# Patient Record
Sex: FEMALE | Race: BLACK OR AFRICAN AMERICAN | NOT HISPANIC OR LATINO | Employment: OTHER | URBAN - METROPOLITAN AREA
[De-identification: names, ages, dates, MRNs, and addresses within clinical notes are randomized per-mention and may not be internally consistent; named-entity substitution may affect disease eponyms.]

---

## 2022-12-18 ENCOUNTER — APPOINTMENT (OUTPATIENT)
Dept: GENERAL RADIOLOGY | Facility: HOSPITAL | Age: 72
End: 2022-12-18

## 2022-12-18 ENCOUNTER — HOSPITAL ENCOUNTER (OUTPATIENT)
Facility: HOSPITAL | Age: 72
Setting detail: OBSERVATION
Discharge: HOME | End: 2022-12-21
Attending: EMERGENCY MEDICINE | Admitting: EMERGENCY MEDICINE

## 2022-12-18 ENCOUNTER — HOSPITAL ENCOUNTER (EMERGENCY)
Facility: HOSPITAL | Age: 72
Discharge: HOME | End: 2022-12-18
Attending: EMERGENCY MEDICINE | Admitting: EMERGENCY MEDICINE

## 2022-12-18 VITALS
WEIGHT: 185 LBS | TEMPERATURE: 97.9 F | SYSTOLIC BLOOD PRESSURE: 114 MMHG | HEIGHT: 69 IN | BODY MASS INDEX: 27.4 KG/M2 | DIASTOLIC BLOOD PRESSURE: 62 MMHG | OXYGEN SATURATION: 100 % | HEART RATE: 62 BPM | RESPIRATION RATE: 18 BRPM

## 2022-12-18 DIAGNOSIS — Z87.448 HISTORY OF RENAL INSUFFICIENCY: ICD-10-CM

## 2022-12-18 DIAGNOSIS — M54.42 ACUTE LEFT-SIDED BACK PAIN WITH SCIATICA: Primary | ICD-10-CM

## 2022-12-18 DIAGNOSIS — M51.36 DEGENERATIVE DISC DISEASE, LUMBAR: ICD-10-CM

## 2022-12-18 DIAGNOSIS — Z86.39 HISTORY OF TYPE 2 DIABETES MELLITUS: ICD-10-CM

## 2022-12-18 DIAGNOSIS — M54.59 INTRACTABLE LOW BACK PAIN: ICD-10-CM

## 2022-12-18 DIAGNOSIS — N39.0 ACUTE URINARY TRACT INFECTION: Primary | ICD-10-CM

## 2022-12-18 DIAGNOSIS — M54.42 ACUTE BILATERAL LOW BACK PAIN WITH LEFT-SIDED SCIATICA: ICD-10-CM

## 2022-12-18 PROCEDURE — 73502 X-RAY EXAM HIP UNI 2-3 VIEWS: CPT

## 2022-12-18 PROCEDURE — 99284 EMERGENCY DEPT VISIT MOD MDM: CPT

## 2022-12-18 PROCEDURE — 72110 X-RAY EXAM L-2 SPINE 4/>VWS: CPT

## 2022-12-18 PROCEDURE — 99283 EMERGENCY DEPT VISIT LOW MDM: CPT

## 2022-12-18 RX ORDER — TRAMADOL HYDROCHLORIDE 50 MG/1
50 TABLET ORAL ONCE
Status: COMPLETED | OUTPATIENT
Start: 2022-12-18 | End: 2022-12-18

## 2022-12-18 RX ORDER — TIZANIDINE 4 MG/1
2 TABLET ORAL ONCE
Status: COMPLETED | OUTPATIENT
Start: 2022-12-18 | End: 2022-12-18

## 2022-12-18 RX ORDER — LIDOCAINE 50 MG/G
1 PATCH TOPICAL
Status: DISCONTINUED | OUTPATIENT
Start: 2022-12-18 | End: 2022-12-18 | Stop reason: HOSPADM

## 2022-12-18 RX ORDER — ACETAMINOPHEN 500 MG
1000 TABLET ORAL ONCE
Status: COMPLETED | OUTPATIENT
Start: 2022-12-18 | End: 2022-12-18

## 2022-12-18 RX ORDER — LIDOCAINE 50 MG/G
1 PATCH TOPICAL EVERY 24 HOURS
Qty: 12 PATCH | Refills: 0 | Status: SHIPPED | OUTPATIENT
Start: 2022-12-18

## 2022-12-18 RX ORDER — TIZANIDINE HYDROCHLORIDE 2 MG/1
2 CAPSULE, GELATIN COATED ORAL 3 TIMES DAILY
Qty: 10 CAPSULE | Refills: 0 | Status: SHIPPED | OUTPATIENT
Start: 2022-12-18

## 2022-12-18 RX ADMIN — TIZANIDINE 2 MG: 4 TABLET ORAL at 03:06

## 2022-12-18 RX ADMIN — LIDOCAINE 1 PATCH: 700 PATCH TOPICAL at 03:06

## 2022-12-18 RX ADMIN — ACETAMINOPHEN 1000 MG: 500 TABLET ORAL at 03:06

## 2022-12-18 RX ADMIN — TRAMADOL HYDROCHLORIDE 50 MG: 50 TABLET, COATED ORAL at 04:10

## 2022-12-18 NOTE — DISCHARGE INSTRUCTIONS
Please follow-up with your primary care provider, if you not have a primary care provider please utilize patient connection above to establish care  Return to the ED for new or worsening symptoms  Follow up with Specialist if indicated above-call for an appointment  May continue your Tylenol at home

## 2022-12-18 NOTE — ED PROVIDER NOTES
Subjective   History of Present Illness  Patient presents with:  Hip Pain    No primary care provider on file.   No LMP recorded. Patient has had a hysterectomy.  Patient is a 72-year-old female presents the ED with complaint of left hip pain, low back pain.  Patient reports this began on Friday, she denies any new direct trauma injury or fall.  She reports that the pain is in the left lower back down the left hip and down the back of the left leg.  Denies any weakness, no urinary retention, bowel or bladder incontinence, no IVDA.  She reports that she feels that she intermittently has numbness and tingling down the left leg.  Normally pain or swelling.  No fever chills.  No urinary retention, constipation, bowel or bladder incontinence.            Review of Systems   Constitutional: Negative for chills and fever.   Cardiovascular: Negative for chest pain and leg swelling.   Gastrointestinal: Negative for abdominal pain.   Musculoskeletal: Positive for back pain.   Skin: Negative for color change, rash and wound.   Neurological: Positive for numbness. Negative for weakness.         History reviewed. No pertinent past medical history.  Current Medications:   Outpatient Medications as of 10/18/2022   Medication Sig Dispense Refill   • Blood Glucose Monitoring Suppl (ONE TOUCH ULTRA 2) W/DEVICE KIT Inject 1 each into the skin daily 1 each 0   • carvedilol (COREG) 3.125 MG tablet Take 1 tablet by mouth 2 (two) times daily with meals. 180 tablet 3   • Cholecalciferol (VITAMIN D) 2000 units tablet Take 2 tablets by mouth daily. 120 tablet 3   • ergocalciferol (ERGOCALCIFEROL) 1.25 MG (96158 UT) capsule Take 50,000 Units by mouth.   • glucose blood (ONE TOUCH ULTRA TEST) test strip Test blood sugar TID or as directed by physician. 300 strip 11   • hydrALAZINE (APRESOLINE) 10 MG tablet Take 10 mg by mouth 3 (three) times daily .   • insulin detemir (LEVEMIR FLEXTOUCH) 100 UNIT/ML pen Inject 100 Units into the skin nightly.  30 pen 3   • insulin lispro (HUMALOG KWIKPEN) 200 UNIT/ML SOL PEN inj pen Inject 40 Units into the skin 3 (three) times daily before meals. 18 pen 3   • Insulin Pen Needle 31G X 6 MM MISC Inject 1 each into the skin daily 3-4 times daily. 400 each 11   • Lancets (ONETOUCH ULTRASOFT) lancets Test blood sugar TID or as directed by physician. 300 each 3   • levothyroxine (SYNTHROID) 100 MCG tablet Take 1 tablet by mouth daily. 90 tablet 3     Problem List:  Patient Active Problem List   Diagnosis Date Noted   • Type 2 diabetes mellitus with stage 3 chronic kidney disease (CMS/[facility]) 12/08/2022   • Long-term insulin use (CMS/HCC) 12/08/2022   • Family history of breast cancer in sister 07/12/2019   • Chronic kidney disease (CKD) stage G3a/A2, moderately decreased glomerular filtration rate (GFR) between 45-59 mL/min/1.73 square meter and albuminuria creatinine ratio between  mg/g (CMS/[facility]) 10/11/2017   • Microalbuminuria due to type 2 diabetes mellitus (CMS/[facility]) 02/19/2017   • Tubular adenoma 11/27/2016   • Diverticulosis of colon 11/21/2016   • Internal hemorrhoid 11/21/2016   • Vitamin D deficiency 11/17/2016   • Hypothyroidism 09/28/2016   • Multinodular goiter 04/01/2016   • Hyperlipidemia LDL goal <70 03/14/2016   • Type 2 diabetes mellitus with hemoglobin A1c goal of less than 7.5% (CMS/[facility]) 03/14/2016   • HTN, goal below 130/80      No Known Allergies    History reviewed. No pertinent surgical history.    History reviewed. No pertinent family history.    Social History     Socioeconomic History   • Marital status:            Objective   Physical Exam  Vitals and nursing note reviewed.   Constitutional:       Appearance: Normal appearance.   HENT:      Head: Normocephalic and atraumatic.   Cardiovascular:      Rate and Rhythm: Normal rate and regular rhythm.      Heart sounds: Normal heart sounds. No murmur heard.    No friction rub. No gallop.   Pulmonary:      Effort: Pulmonary effort is normal.       "Breath sounds: Normal breath sounds.   Musculoskeletal:      Lumbar back: Tenderness present. No bony tenderness.        Back:       Comments: No vertebral point tenderness noted to the C-spine T-spine or L-spine.  No palpable step-offs.  No skin abnormalities are appreciated.  No saddle anesthesia.  Soft tissue tenderness noted over the SI joint. No yahir tenderness to the hip or extremities.    Skin:     General: Skin is warm and dry.   Neurological:      Mental Status: She is alert and oriented to person, place, and time.      GCS: GCS eye subscore is 4. GCS verbal subscore is 5. GCS motor subscore is 6.      Motor: Motor function is intact. No weakness.      Deep Tendon Reflexes:      Reflex Scores:       Patellar reflexes are 2+ on the right side and 2+ on the left side.     Comments: Sensation intact to light touch.  No weakness appreciated to bilateral lower extremities         Procedures           ED Course  ED Course as of 12/18/22 0434   Sun Dec 18, 2022   0336 Patient reports pain is improving, but is worse when she gets up and moves.  Reports the patient has a history of kidney disease she cannot take NSAIDs.  She also has a history of diabetes, no refill steroids would increase her blood sugar.  We will give her a dose of tramadol here she is going to continue to take Tylenol at home, will prescribe her Lidoderm patches as well as Zanaflex.  Patient agrees with this treatment plan [LB]   0418 Pt's , son and daughter at bedside. Agree with current tx plan of care, updated on results.  [LB]   0433 Ambulating out of ED at d/c. [LB]      ED Course User Index  [LB] Shirley Graham, SPENCER      /62   Pulse 62   Temp 97.9 °F (36.6 °C) (Oral)   Resp 18   Ht 175.3 cm (69\")   Wt 83.9 kg (185 lb)   SpO2 100%   BMI 27.32 kg/m²   Labs Reviewed - No data to display  Medications   lidocaine (LIDODERM) 5 % 1 patch (1 patch Transdermal Medication Applied 12/18/22 0306)   tiZANidine (ZANAFLEX) " tablet 2 mg (2 mg Oral Given 12/18/22 0306)   acetaminophen (TYLENOL) tablet 1,000 mg (1,000 mg Oral Given 12/18/22 0306)   traMADol (ULTRAM) tablet 50 mg (50 mg Oral Given 12/18/22 0410)     XR Spine Lumbar Complete 4+VW    Result Date: 12/18/2022  1. No acute osseous abnormality. 2. Degenerative grade 1 anterolisthesis of L4 on L5 with mild degenerative changes at this level. Slot 63 Electronically signed by:  Mark Smith M.D.  12/18/2022 1:31 AM Mountain Time    XR Hip With or Without Pelvis 2 - 3 View Left    Result Date: 12/18/2022  Normal left hip. Electronically signed by:  Josué Simpson M.D.  12/18/2022 1:31 AM Mountain Time                                         MDM  Number of Diagnoses or Management Options     Amount and/or Complexity of Data Reviewed  Tests in the radiology section of CPT®: reviewed      Chart review: Reviewed patient's office visit on October 18 with elicia Coronel everywhere. Patient does have a history of chronic kidney disease, diabetes, hyperlipidemia.  Appropriate PPE worn during patient interactions.   Differentials: Sciatica, cauda equina, strain  This is not an all inclusive list of diagnosis considered.   Patient was brought back to the emergency department room for evaluation and placed on appropriate monitoring.  She presented with left lower back pain, radiates to left leg, signs and symptoms to be consistent with sciatica.  She did have x-ray imaging which reveals no acute findings.  There is no evidence for cauda equina.  She reports her pain is improved here.  She reports at home she was unable to move, she is able to move while here, she is able to ambulate. Given patient's chronic kidney disease and diabetes she will continue Tylenol, she will be given a short course of tizanidine, as well as Lidoderm patches.  Advise follow-up with PCP for continued reevaluation and further management of her back pain.  Disposition: I spoke with the patient at the bedside  regarding their plan of care, discharge instruction,  prescriptions, as well as reasons to return to the emergency department.  We discussed test results at the bedside, including incidental abnormal labs, radiological findings, understands need and importance  for follow-up with primary care or specialist if indicated.  Patient verbalizes understanding and agrees to the treatment plan at this time.    Note Disclaimer: At [facility], we believe that sharing information builds trust and better relationships. You are receiving this note because you recently visited [facility]. It is possible you will see health information before a provider has talked with you about it. This kind of information can be easy to misunderstand. To help you fully understand what it means for your health, we urge you to discuss this note with your provider.  Note dictated utilizing Dragon Dictation.     Final diagnoses:   Acute left-sided back pain with sciatica       ED Disposition  ED Disposition     ED Disposition   Discharge    Condition   Stable    Comment   --             Evita Keith, APRN  [address] [place] [address] [phone]    Schedule an appointment as soon as possible for a visit       [facility] EMERGENCY DEPARTMENT  [facility] [address] [phone]    As needed, If symptoms worsen         Medication List      New Prescriptions    lidocaine 5 %  Commonly known as: LIDODERM  Place 1 patch on the skin as directed by provider Daily. Remove & Discard patch within 12 hours or as directed by MD     TiZANidine 2 MG capsule  Commonly known as: ZANAFLEX  Take 1 capsule by mouth 3 (Three) Times a Day.           Where to Get Your Medications      These medications were sent to [facility][id number] - [facility] [place] - [address] AT [place] [address] [address] [facility] [phone] [facility] [phone] FX  [address] [place] [address]     Phone: [phone]   · lidocaine 5 %  · TiZANidine 2 MG capsule          Shirley Graham, SPENCER  12/18/22 7027       Shirley Graham, SPENCER  12/18/22 5543       Shirley Graham, SPENCER  12/18/22 9421

## 2022-12-19 ENCOUNTER — APPOINTMENT (OUTPATIENT)
Dept: MRI IMAGING | Facility: HOSPITAL | Age: 72
End: 2022-12-19

## 2022-12-19 ENCOUNTER — APPOINTMENT (OUTPATIENT)
Dept: CT IMAGING | Facility: HOSPITAL | Age: 72
End: 2022-12-19

## 2022-12-19 PROBLEM — M51.26 DEGENERATION OF LUMBAR INTERVERTEBRAL DISC WITH ACUTE HERNIATION: Status: ACTIVE | Noted: 2022-12-19

## 2022-12-19 PROBLEM — M51.36 DEGENERATION OF LUMBAR INTERVERTEBRAL DISC WITH ACUTE HERNIATION: Status: ACTIVE | Noted: 2022-12-19

## 2022-12-19 PROBLEM — M51.369 DEGENERATION OF LUMBAR INTERVERTEBRAL DISC WITH ACUTE HERNIATION: Status: ACTIVE | Noted: 2022-12-19

## 2022-12-19 LAB
ALBUMIN SERPL-MCNC: 4.1 G/DL (ref 3.5–5.2)
ALBUMIN/GLOB SERPL: 1.4 G/DL
ALP SERPL-CCNC: 96 U/L (ref 39–117)
ALT SERPL W P-5'-P-CCNC: 10 U/L (ref 1–33)
ANION GAP SERPL CALCULATED.3IONS-SCNC: 11 MMOL/L (ref 5–15)
AST SERPL-CCNC: 12 U/L (ref 1–32)
BACTERIA UR QL AUTO: ABNORMAL /HPF
BASOPHILS # BLD AUTO: 0 10*3/MM3 (ref 0–0.2)
BASOPHILS NFR BLD AUTO: 0.4 % (ref 0–1.5)
BILIRUB SERPL-MCNC: 0.3 MG/DL (ref 0–1.2)
BILIRUB UR QL STRIP: NEGATIVE
BUN SERPL-MCNC: 23 MG/DL (ref 8–23)
BUN/CREAT SERPL: 19.7 (ref 7–25)
CALCIUM SPEC-SCNC: 9.2 MG/DL (ref 8.6–10.5)
CHLORIDE SERPL-SCNC: 105 MMOL/L (ref 98–107)
CK SERPL-CCNC: 230 U/L (ref 20–180)
CLARITY UR: CLEAR
CO2 SERPL-SCNC: 22 MMOL/L (ref 22–29)
COLOR UR: YELLOW
CREAT SERPL-MCNC: 1.17 MG/DL (ref 0.57–1)
DEPRECATED RDW RBC AUTO: 42.9 FL (ref 37–54)
EGFRCR SERPLBLD CKD-EPI 2021: 50 ML/MIN/1.73
EOSINOPHIL # BLD AUTO: 0 10*3/MM3 (ref 0–0.4)
EOSINOPHIL NFR BLD AUTO: 0 % (ref 0.3–6.2)
ERYTHROCYTE [DISTWIDTH] IN BLOOD BY AUTOMATED COUNT: 14.4 % (ref 12.3–15.4)
GLOBULIN UR ELPH-MCNC: 2.9 GM/DL
GLUCOSE BLDC GLUCOMTR-MCNC: 234 MG/DL (ref 70–105)
GLUCOSE BLDC GLUCOMTR-MCNC: 238 MG/DL (ref 70–105)
GLUCOSE BLDC GLUCOMTR-MCNC: 254 MG/DL (ref 70–105)
GLUCOSE BLDC GLUCOMTR-MCNC: 315 MG/DL (ref 70–105)
GLUCOSE SERPL-MCNC: 190 MG/DL (ref 65–99)
GLUCOSE UR STRIP-MCNC: NEGATIVE MG/DL
HCT VFR BLD AUTO: 34.4 % (ref 34–46.6)
HGB BLD-MCNC: 10.5 G/DL (ref 12–15.9)
HGB UR QL STRIP.AUTO: ABNORMAL
HYALINE CASTS UR QL AUTO: ABNORMAL /LPF
KETONES UR QL STRIP: ABNORMAL
LEUKOCYTE ESTERASE UR QL STRIP.AUTO: ABNORMAL
LYMPHOCYTES # BLD AUTO: 1.3 10*3/MM3 (ref 0.7–3.1)
LYMPHOCYTES NFR BLD AUTO: 14.8 % (ref 19.6–45.3)
MCH RBC QN AUTO: 25.8 PG (ref 26.6–33)
MCHC RBC AUTO-ENTMCNC: 30.7 G/DL (ref 31.5–35.7)
MCV RBC AUTO: 84.2 FL (ref 79–97)
MONOCYTES # BLD AUTO: 0.5 10*3/MM3 (ref 0.1–0.9)
MONOCYTES NFR BLD AUTO: 5.9 % (ref 5–12)
NEUTROPHILS NFR BLD AUTO: 6.8 10*3/MM3 (ref 1.7–7)
NEUTROPHILS NFR BLD AUTO: 78.9 % (ref 42.7–76)
NITRITE UR QL STRIP: POSITIVE
NRBC BLD AUTO-RTO: 0 /100 WBC (ref 0–0.2)
PH UR STRIP.AUTO: 5.5 [PH] (ref 5–8)
PLATELET # BLD AUTO: 269 10*3/MM3 (ref 140–450)
PMV BLD AUTO: 9 FL (ref 6–12)
POTASSIUM SERPL-SCNC: 3.8 MMOL/L (ref 3.5–5.2)
PROT SERPL-MCNC: 7 G/DL (ref 6–8.5)
PROT UR QL STRIP: ABNORMAL
RBC # BLD AUTO: 4.08 10*6/MM3 (ref 3.77–5.28)
RBC # UR STRIP: ABNORMAL /HPF
REF LAB TEST METHOD: ABNORMAL
SODIUM SERPL-SCNC: 138 MMOL/L (ref 136–145)
SP GR UR STRIP: 1.01 (ref 1–1.03)
SQUAMOUS #/AREA URNS HPF: ABNORMAL /HPF
UROBILINOGEN UR QL STRIP: ABNORMAL
WBC # UR STRIP: ABNORMAL /HPF
WBC NRBC COR # BLD: 8.6 10*3/MM3 (ref 3.4–10.8)

## 2022-12-19 PROCEDURE — G0378 HOSPITAL OBSERVATION PER HR: HCPCS

## 2022-12-19 PROCEDURE — 80053 COMPREHEN METABOLIC PANEL: CPT | Performed by: EMERGENCY MEDICINE

## 2022-12-19 PROCEDURE — 97162 PT EVAL MOD COMPLEX 30 MIN: CPT | Performed by: PHYSICAL THERAPIST

## 2022-12-19 PROCEDURE — 82962 GLUCOSE BLOOD TEST: CPT

## 2022-12-19 PROCEDURE — 85025 COMPLETE CBC W/AUTO DIFF WBC: CPT | Performed by: EMERGENCY MEDICINE

## 2022-12-19 PROCEDURE — 82550 ASSAY OF CK (CPK): CPT | Performed by: EMERGENCY MEDICINE

## 2022-12-19 PROCEDURE — 25010000002 CEFTRIAXONE PER 250 MG: Performed by: EMERGENCY MEDICINE

## 2022-12-19 PROCEDURE — 96375 TX/PRO/DX INJ NEW DRUG ADDON: CPT

## 2022-12-19 PROCEDURE — 96376 TX/PRO/DX INJ SAME DRUG ADON: CPT

## 2022-12-19 PROCEDURE — 87086 URINE CULTURE/COLONY COUNT: CPT | Performed by: EMERGENCY MEDICINE

## 2022-12-19 PROCEDURE — 72148 MRI LUMBAR SPINE W/O DYE: CPT

## 2022-12-19 PROCEDURE — 63710000001 INSULIN LISPRO (HUMAN) PER 5 UNITS: Performed by: NURSE PRACTITIONER

## 2022-12-19 PROCEDURE — 72131 CT LUMBAR SPINE W/O DYE: CPT

## 2022-12-19 PROCEDURE — 25010000002 METHYLPREDNISOLONE PER 40 MG: Performed by: NURSE PRACTITIONER

## 2022-12-19 PROCEDURE — 25010000002 HYDROMORPHONE 1 MG/ML SOLUTION: Performed by: NURSE PRACTITIONER

## 2022-12-19 PROCEDURE — 87077 CULTURE AEROBIC IDENTIFY: CPT | Performed by: EMERGENCY MEDICINE

## 2022-12-19 PROCEDURE — 25010000002 HYDROMORPHONE 1 MG/ML SOLUTION: Performed by: EMERGENCY MEDICINE

## 2022-12-19 PROCEDURE — 63710000001 INSULIN GLARGINE PER 5 UNITS: Performed by: NURSE PRACTITIONER

## 2022-12-19 PROCEDURE — 25010000002 ONDANSETRON PER 1 MG: Performed by: EMERGENCY MEDICINE

## 2022-12-19 PROCEDURE — 81001 URINALYSIS AUTO W/SCOPE: CPT | Performed by: EMERGENCY MEDICINE

## 2022-12-19 PROCEDURE — 96365 THER/PROPH/DIAG IV INF INIT: CPT

## 2022-12-19 PROCEDURE — 25010000002 METHYLPREDNISOLONE PER 40 MG: Performed by: EMERGENCY MEDICINE

## 2022-12-19 PROCEDURE — 87186 SC STD MICRODIL/AGAR DIL: CPT | Performed by: EMERGENCY MEDICINE

## 2022-12-19 RX ORDER — SODIUM CHLORIDE 9 MG/ML
40 INJECTION, SOLUTION INTRAVENOUS AS NEEDED
Status: DISCONTINUED | OUTPATIENT
Start: 2022-12-19 | End: 2022-12-19

## 2022-12-19 RX ORDER — ONDANSETRON 2 MG/ML
4 INJECTION INTRAMUSCULAR; INTRAVENOUS ONCE
Status: COMPLETED | OUTPATIENT
Start: 2022-12-19 | End: 2022-12-19

## 2022-12-19 RX ORDER — ATORVASTATIN CALCIUM 20 MG/1
40 TABLET, FILM COATED ORAL NIGHTLY
COMMUNITY

## 2022-12-19 RX ORDER — OLMESARTAN MEDOXOMIL 20 MG/1
40 TABLET ORAL DAILY
COMMUNITY
End: 2022-12-21 | Stop reason: HOSPADM

## 2022-12-19 RX ORDER — OLANZAPINE 10 MG/2ML
1 INJECTION, POWDER, LYOPHILIZED, FOR SOLUTION INTRAMUSCULAR
Status: DISCONTINUED | OUTPATIENT
Start: 2022-12-19 | End: 2022-12-21 | Stop reason: HOSPADM

## 2022-12-19 RX ORDER — INSULIN LISPRO 100 [IU]/ML
4-24 INJECTION, SOLUTION INTRAVENOUS; SUBCUTANEOUS
Status: DISCONTINUED | OUTPATIENT
Start: 2022-12-19 | End: 2022-12-21 | Stop reason: HOSPADM

## 2022-12-19 RX ORDER — ACETAMINOPHEN 325 MG/1
650 TABLET ORAL EVERY 4 HOURS PRN
Status: DISCONTINUED | OUTPATIENT
Start: 2022-12-19 | End: 2022-12-19 | Stop reason: SDUPTHER

## 2022-12-19 RX ORDER — ONDANSETRON 2 MG/ML
4 INJECTION INTRAMUSCULAR; INTRAVENOUS EVERY 6 HOURS PRN
Status: DISCONTINUED | OUTPATIENT
Start: 2022-12-19 | End: 2022-12-21 | Stop reason: HOSPADM

## 2022-12-19 RX ORDER — ONDANSETRON 2 MG/ML
4 INJECTION INTRAMUSCULAR; INTRAVENOUS EVERY 6 HOURS PRN
Status: DISCONTINUED | OUTPATIENT
Start: 2022-12-19 | End: 2022-12-19 | Stop reason: SDUPTHER

## 2022-12-19 RX ORDER — INSULIN LISPRO 100 [IU]/ML
3-14 INJECTION, SOLUTION INTRAVENOUS; SUBCUTANEOUS
Status: DISCONTINUED | OUTPATIENT
Start: 2022-12-19 | End: 2022-12-19 | Stop reason: ALTCHOICE

## 2022-12-19 RX ORDER — HYDRALAZINE HYDROCHLORIDE 10 MG/1
10 TABLET, FILM COATED ORAL NIGHTLY
Status: DISCONTINUED | OUTPATIENT
Start: 2022-12-19 | End: 2022-12-21 | Stop reason: HOSPADM

## 2022-12-19 RX ORDER — METHYLPREDNISOLONE SODIUM SUCCINATE 40 MG/ML
40 INJECTION, POWDER, LYOPHILIZED, FOR SOLUTION INTRAMUSCULAR; INTRAVENOUS EVERY 8 HOURS
Status: DISCONTINUED | OUTPATIENT
Start: 2022-12-19 | End: 2022-12-20

## 2022-12-19 RX ORDER — SODIUM CHLORIDE 0.9 % (FLUSH) 0.9 %
10 SYRINGE (ML) INJECTION AS NEEDED
Status: DISCONTINUED | OUTPATIENT
Start: 2022-12-19 | End: 2022-12-21 | Stop reason: HOSPADM

## 2022-12-19 RX ORDER — LEVOTHYROXINE SODIUM 0.1 MG/1
100 TABLET ORAL
Status: DISCONTINUED | OUTPATIENT
Start: 2022-12-19 | End: 2022-12-21 | Stop reason: HOSPADM

## 2022-12-19 RX ORDER — LEVOTHYROXINE SODIUM 0.1 MG/1
100 TABLET ORAL DAILY
COMMUNITY

## 2022-12-19 RX ORDER — SODIUM CHLORIDE 0.9 % (FLUSH) 0.9 %
10 SYRINGE (ML) INJECTION EVERY 12 HOURS SCHEDULED
Status: DISCONTINUED | OUTPATIENT
Start: 2022-12-19 | End: 2022-12-21 | Stop reason: HOSPADM

## 2022-12-19 RX ORDER — SODIUM CHLORIDE 9 MG/ML
100 INJECTION, SOLUTION INTRAVENOUS CONTINUOUS
Status: DISCONTINUED | OUTPATIENT
Start: 2022-12-19 | End: 2022-12-21

## 2022-12-19 RX ORDER — ACETAMINOPHEN 325 MG/1
650 TABLET ORAL EVERY 6 HOURS PRN
COMMUNITY

## 2022-12-19 RX ORDER — HYDRALAZINE HYDROCHLORIDE 10 MG/1
10 TABLET, FILM COATED ORAL NIGHTLY
COMMUNITY

## 2022-12-19 RX ORDER — NICOTINE POLACRILEX 4 MG
15 LOZENGE BUCCAL
Status: DISCONTINUED | OUTPATIENT
Start: 2022-12-19 | End: 2022-12-21 | Stop reason: HOSPADM

## 2022-12-19 RX ORDER — ONDANSETRON 4 MG/1
4 TABLET, FILM COATED ORAL EVERY 6 HOURS PRN
Status: DISCONTINUED | OUTPATIENT
Start: 2022-12-19 | End: 2022-12-21 | Stop reason: HOSPADM

## 2022-12-19 RX ORDER — DEXTROSE MONOHYDRATE 25 G/50ML
25 INJECTION, SOLUTION INTRAVENOUS
Status: DISCONTINUED | OUTPATIENT
Start: 2022-12-19 | End: 2022-12-21 | Stop reason: HOSPADM

## 2022-12-19 RX ORDER — CARVEDILOL 3.12 MG/1
3.12 TABLET ORAL 2 TIMES DAILY WITH MEALS
Status: DISCONTINUED | OUTPATIENT
Start: 2022-12-19 | End: 2022-12-21 | Stop reason: HOSPADM

## 2022-12-19 RX ORDER — BISACODYL 5 MG/1
5 TABLET, DELAYED RELEASE ORAL DAILY PRN
Status: DISCONTINUED | OUTPATIENT
Start: 2022-12-19 | End: 2022-12-21 | Stop reason: HOSPADM

## 2022-12-19 RX ORDER — TIZANIDINE 4 MG/1
4 TABLET ORAL EVERY 8 HOURS SCHEDULED
Status: DISCONTINUED | OUTPATIENT
Start: 2022-12-19 | End: 2022-12-21 | Stop reason: HOSPADM

## 2022-12-19 RX ORDER — SODIUM CHLORIDE 9 MG/ML
40 INJECTION, SOLUTION INTRAVENOUS AS NEEDED
Status: DISCONTINUED | OUTPATIENT
Start: 2022-12-19 | End: 2022-12-21 | Stop reason: HOSPADM

## 2022-12-19 RX ORDER — POLYETHYLENE GLYCOL 3350 17 G/17G
17 POWDER, FOR SOLUTION ORAL DAILY PRN
Status: DISCONTINUED | OUTPATIENT
Start: 2022-12-19 | End: 2022-12-21 | Stop reason: HOSPADM

## 2022-12-19 RX ORDER — CHOLECALCIFEROL (VITAMIN D3) 125 MCG
5 CAPSULE ORAL NIGHTLY PRN
Status: DISCONTINUED | OUTPATIENT
Start: 2022-12-19 | End: 2022-12-21 | Stop reason: HOSPADM

## 2022-12-19 RX ORDER — HYDRALAZINE HYDROCHLORIDE 10 MG/1
20 TABLET, FILM COATED ORAL DAILY
Status: DISCONTINUED | OUTPATIENT
Start: 2022-12-19 | End: 2022-12-21 | Stop reason: HOSPADM

## 2022-12-19 RX ORDER — HYDRALAZINE HYDROCHLORIDE 10 MG/1
20 TABLET, FILM COATED ORAL EVERY MORNING
COMMUNITY

## 2022-12-19 RX ORDER — BISACODYL 10 MG
10 SUPPOSITORY, RECTAL RECTAL DAILY PRN
Status: DISCONTINUED | OUTPATIENT
Start: 2022-12-19 | End: 2022-12-21 | Stop reason: HOSPADM

## 2022-12-19 RX ORDER — NICOTINE POLACRILEX 4 MG
15 LOZENGE BUCCAL
Status: DISCONTINUED | OUTPATIENT
Start: 2022-12-19 | End: 2022-12-19 | Stop reason: ALTCHOICE

## 2022-12-19 RX ORDER — CARVEDILOL 3.12 MG/1
3.12 TABLET ORAL 2 TIMES DAILY WITH MEALS
COMMUNITY

## 2022-12-19 RX ORDER — AMLODIPINE BESYLATE 10 MG/1
10 TABLET ORAL NIGHTLY
COMMUNITY

## 2022-12-19 RX ORDER — TIZANIDINE 2 MG/1
2 TABLET ORAL EVERY 8 HOURS SCHEDULED
Status: DISCONTINUED | OUTPATIENT
Start: 2022-12-19 | End: 2022-12-19

## 2022-12-19 RX ORDER — LOSARTAN POTASSIUM 50 MG/1
50 TABLET ORAL
Status: DISCONTINUED | OUTPATIENT
Start: 2022-12-19 | End: 2022-12-21

## 2022-12-19 RX ORDER — TRAMADOL HYDROCHLORIDE 50 MG/1
50 TABLET ORAL EVERY 6 HOURS PRN
Status: DISCONTINUED | OUTPATIENT
Start: 2022-12-19 | End: 2022-12-21 | Stop reason: HOSPADM

## 2022-12-19 RX ORDER — LIDOCAINE 50 MG/G
1 PATCH TOPICAL EVERY 24 HOURS
Status: DISCONTINUED | OUTPATIENT
Start: 2022-12-19 | End: 2022-12-21 | Stop reason: HOSPADM

## 2022-12-19 RX ORDER — ATORVASTATIN CALCIUM 40 MG/1
40 TABLET, FILM COATED ORAL NIGHTLY
Status: DISCONTINUED | OUTPATIENT
Start: 2022-12-19 | End: 2022-12-21 | Stop reason: HOSPADM

## 2022-12-19 RX ORDER — ACETAMINOPHEN 325 MG/1
650 TABLET ORAL EVERY 4 HOURS PRN
Status: DISCONTINUED | OUTPATIENT
Start: 2022-12-19 | End: 2022-12-21 | Stop reason: HOSPADM

## 2022-12-19 RX ORDER — INSULIN LISPRO 100 [IU]/ML
30 INJECTION, SOLUTION INTRAVENOUS; SUBCUTANEOUS
COMMUNITY

## 2022-12-19 RX ORDER — OLANZAPINE 10 MG/2ML
1 INJECTION, POWDER, LYOPHILIZED, FOR SOLUTION INTRAMUSCULAR
Status: DISCONTINUED | OUTPATIENT
Start: 2022-12-19 | End: 2022-12-19 | Stop reason: ALTCHOICE

## 2022-12-19 RX ORDER — AMLODIPINE BESYLATE 5 MG/1
10 TABLET ORAL NIGHTLY
Status: DISCONTINUED | OUTPATIENT
Start: 2022-12-19 | End: 2022-12-21 | Stop reason: HOSPADM

## 2022-12-19 RX ADMIN — Medication 10 ML: at 09:47

## 2022-12-19 RX ADMIN — HYDRALAZINE HYDROCHLORIDE 10 MG: 10 TABLET, FILM COATED ORAL at 22:13

## 2022-12-19 RX ADMIN — SODIUM CHLORIDE 250 ML: 9 INJECTION, SOLUTION INTRAVENOUS at 01:00

## 2022-12-19 RX ADMIN — TRAMADOL HYDROCHLORIDE 50 MG: 50 TABLET, COATED ORAL at 22:12

## 2022-12-19 RX ADMIN — CEFTRIAXONE 1 G: 1 INJECTION, POWDER, FOR SOLUTION INTRAMUSCULAR; INTRAVENOUS at 03:14

## 2022-12-19 RX ADMIN — HYDROMORPHONE HYDROCHLORIDE 0.5 MG: 1 INJECTION, SOLUTION INTRAMUSCULAR; INTRAVENOUS; SUBCUTANEOUS at 10:18

## 2022-12-19 RX ADMIN — CARVEDILOL 3.12 MG: 3.12 TABLET, FILM COATED ORAL at 07:52

## 2022-12-19 RX ADMIN — METHYLPREDNISOLONE SODIUM SUCCINATE 40 MG: 40 INJECTION, POWDER, FOR SOLUTION INTRAMUSCULAR; INTRAVENOUS at 18:18

## 2022-12-19 RX ADMIN — CARVEDILOL 3.12 MG: 3.12 TABLET, FILM COATED ORAL at 17:37

## 2022-12-19 RX ADMIN — HYDRALAZINE HYDROCHLORIDE 20 MG: 10 TABLET, FILM COATED ORAL at 08:03

## 2022-12-19 RX ADMIN — Medication 10 ML: at 22:14

## 2022-12-19 RX ADMIN — METHYLPREDNISOLONE SODIUM SUCCINATE 40 MG: 40 INJECTION, POWDER, FOR SOLUTION INTRAMUSCULAR; INTRAVENOUS at 03:14

## 2022-12-19 RX ADMIN — INSULIN GLARGINE 100 UNITS: 100 INJECTION, SOLUTION SUBCUTANEOUS at 22:11

## 2022-12-19 RX ADMIN — ONDANSETRON 4 MG: 2 INJECTION INTRAMUSCULAR; INTRAVENOUS at 01:01

## 2022-12-19 RX ADMIN — LEVOTHYROXINE SODIUM 100 MCG: 0.1 TABLET ORAL at 07:52

## 2022-12-19 RX ADMIN — ATORVASTATIN CALCIUM 40 MG: 40 TABLET, FILM COATED ORAL at 22:13

## 2022-12-19 RX ADMIN — HYDROMORPHONE HYDROCHLORIDE 0.5 MG: 1 INJECTION, SOLUTION INTRAMUSCULAR; INTRAVENOUS; SUBCUTANEOUS at 05:19

## 2022-12-19 RX ADMIN — HYDROMORPHONE HYDROCHLORIDE 0.5 MG: 1 INJECTION, SOLUTION INTRAMUSCULAR; INTRAVENOUS; SUBCUTANEOUS at 03:14

## 2022-12-19 RX ADMIN — LIDOCAINE 1 PATCH: 50 PATCH CUTANEOUS at 09:46

## 2022-12-19 RX ADMIN — INSULIN LISPRO 5 UNITS: 100 INJECTION, SOLUTION INTRAVENOUS; SUBCUTANEOUS at 09:46

## 2022-12-19 RX ADMIN — INSULIN LISPRO 10 UNITS: 100 INJECTION, SOLUTION INTRAVENOUS; SUBCUTANEOUS at 13:19

## 2022-12-19 RX ADMIN — HYDROMORPHONE HYDROCHLORIDE 0.5 MG: 1 INJECTION, SOLUTION INTRAMUSCULAR; INTRAVENOUS; SUBCUTANEOUS at 07:57

## 2022-12-19 RX ADMIN — TRAMADOL HYDROCHLORIDE 50 MG: 50 TABLET, COATED ORAL at 12:41

## 2022-12-19 RX ADMIN — TIZANIDINE 4 MG: 4 TABLET ORAL at 22:14

## 2022-12-19 RX ADMIN — TIZANIDINE 2 MG: 2 TABLET ORAL at 13:19

## 2022-12-19 RX ADMIN — AMLODIPINE BESYLATE 10 MG: 5 TABLET ORAL at 22:14

## 2022-12-19 RX ADMIN — TIZANIDINE 2 MG: 2 TABLET ORAL at 07:52

## 2022-12-19 RX ADMIN — HYDROMORPHONE HYDROCHLORIDE 1 MG: 1 INJECTION, SOLUTION INTRAMUSCULAR; INTRAVENOUS; SUBCUTANEOUS at 17:32

## 2022-12-19 RX ADMIN — SODIUM CHLORIDE 100 ML/HR: 9 INJECTION, SOLUTION INTRAVENOUS at 08:03

## 2022-12-19 RX ADMIN — INSULIN LISPRO 12 UNITS: 100 INJECTION, SOLUTION INTRAVENOUS; SUBCUTANEOUS at 17:31

## 2022-12-19 RX ADMIN — HYDROMORPHONE HYDROCHLORIDE 0.5 MG: 1 INJECTION, SOLUTION INTRAMUSCULAR; INTRAVENOUS; SUBCUTANEOUS at 01:01

## 2022-12-19 RX ADMIN — METHYLPREDNISOLONE SODIUM SUCCINATE 40 MG: 40 INJECTION, POWDER, FOR SOLUTION INTRAMUSCULAR; INTRAVENOUS at 10:18

## 2022-12-19 RX ADMIN — LOSARTAN POTASSIUM 50 MG: 50 TABLET, FILM COATED ORAL at 09:48

## 2022-12-19 NOTE — ED NOTES
Nursing report ED to floor  Ml Reyes  72 y.o.  female    HPI:   Chief Complaint   Patient presents with    Sciatica     Pt reports increased pain in L lower back and L hip that radiates into LLE.  Pt denies any loss of bowel/bladder or saddle anesthesia.        Admitting doctor:   William Dominique MD    Admitting diagnosis:   The primary encounter diagnosis was Acute urinary tract infection. Diagnoses of Intractable low back pain, Degenerative disc disease, lumbar, Acute bilateral low back pain with left-sided sciatica, History of type 2 diabetes mellitus, and History of renal insufficiency were also pertinent to this visit.    Code status:   Current Code Status       Date Active Code Status Order ID Comments User Context       12/19/2022 0234 CPR (Attempt to Resuscitate) [id number]  William Dominique MD ED        Question Answer    Code Status (Patient has no pulse and is not breathing) CPR (Attempt to Resuscitate)    Medical Interventions (Patient has pulse or is breathing) Full Support    Level Of Support Discussed With Patient                    Allergies:   Patient has no known allergies.    Isolation:  No active isolations     Fall Risk:  Fall Risk Assessment was completed, and patient is at low risk for falls.   Predictive Model Details         7 (Low) Factor Value    Calculated 12/19/2022 02:07 Age 72    Risk of Fall Model Musculoskeletal Assessment WDL     Active Peripheral IV Present     Imaging order in this encounter Present     Number of Distinct Medication Classes administered 6     Drug Use Not Asked     Skin Assessment WDL     Magnesium not on file     Financial Class Medicare     Diastolic BP 67     Pratik Scale not on file     Number of administrations of Analgesic Narcotics 2     Peripheral Vascular Assessment WDL     Creatinine 1.17 mg/dL     Tobacco Use Not Asked     Gastrointestinal Assessment WDL     Calcium 9.2 mg/dL     Cardiac Assessment WDL     Respiratory Rate 16     Albumin 4.1  "g/dL     Days after Admission 0.089     ALT 10 U/L     Total Bilirubin 0.3 mg/dL     Chloride 105 mmol/L     Potassium 3.8 mmol/L         Weight:       12/18/22 2356   Weight: 86.2 kg (190 lb)       Intake and Output  No intake or output data in the 24 hours ending 12/19/22 0244    Diet:   Dietary Orders (From admission, onward)       Start     Ordered    12/19/22 0237  Diet: Cardiac Diets, Diabetic Diets; Healthy Heart (2-3 Na+); Consistent Carbohydrate; Texture: Regular Texture (IDDSI 7); Fluid Consistency: Thin (IDDSI 0)  Diet Effective Now        References:    Diet Order [place]   Question Answer Comment   Diets: Cardiac Diets    Diets: Diabetic Diets    Cardiac Diet: Healthy Heart (2-3 Na+)    Diabetic Diet: Consistent Carbohydrate    Texture: Regular Texture (IDDSI 7)    Fluid Consistency: Thin (IDDSI 0)        12/19/22 0236                     Most recent vitals:   Vitals:    12/18/22 2356 12/18/22 2357 12/19/22 0231   BP: 167/67  156/76   BP Location: Left arm     Patient Position: Sitting     Pulse:  87 87   Resp: 16  16   Temp: 97.7 °F (36.5 °C)     TempSrc: Oral     SpO2:  100% 98%   Weight: 86.2 kg (190 lb)     Height: 175.3 cm (69\")         Active LDAs/IV Access:   Lines, Drains & Airways       Active LDAs       Name Placement date Placement time Site Days    Peripheral IV 12/19/22 0050 Right Antecubital 12/19/22 0050  Antecubital  less than 1                    Skin Condition:   Skin Assessments (last day)       None             Labs (abnormal labs have a star):   Labs Reviewed   COMPREHENSIVE METABOLIC PANEL - Abnormal; Notable for the following components:       Result Value    Glucose 190 (*)     Creatinine 1.17 (*)     eGFR 50.0 (*)     All other components within normal limits    Narrative:     GFR Normal >60  Chronic Kidney Disease <60  Kidney Failure <15    The GFR formula is only valid for adults with stable renal function between ages 18 and 70.   URINALYSIS W/ CULTURE IF INDICATED - " Abnormal; Notable for the following components:    Ketones, UA 15 mg/dL (1+) (*)     Blood, UA Trace (*)     Protein, UA 30 mg/dL (1+) (*)     Leuk Esterase, UA Trace (*)     Nitrite, UA Positive (*)     All other components within normal limits    Narrative:     In absence of clinical symptoms, the presence of pyuria, bacteria, and/or nitrites on the urinalysis result does not correlate with infection.   CK - Abnormal; Notable for the following components:    Creatine Kinase 230 (*)     All other components within normal limits   CBC WITH AUTO DIFFERENTIAL - Abnormal; Notable for the following components:    Hemoglobin 10.5 (*)     MCH 25.8 (*)     MCHC 30.7 (*)     Neutrophil % 78.9 (*)     Lymphocyte % 14.8 (*)     Eosinophil % 0.0 (*)     All other components within normal limits   URINALYSIS, MICROSCOPIC ONLY - Abnormal; Notable for the following components:    RBC, UA 0-2 (*)     WBC, UA 13-20 (*)     Bacteria, UA 4+ (*)     All other components within normal limits   URINE CULTURE   POCT GLUCOSE FINGERSTICK   POCT GLUCOSE FINGERSTICK   POCT GLUCOSE FINGERSTICK   POCT GLUCOSE FINGERSTICK   CBC AND DIFFERENTIAL    Narrative:     The following orders were created for panel order CBC & Differential.  Procedure                               Abnormality         Status                     ---------                               -----------         ------                     CBC Auto Differential[[id number]]        Abnormal            Final result                 Please view results for these tests on the individual orders.       LOC: Person, Place, Time, and Situation    Telemetry:  Observation Unit    Cardiac Monitoring Ordered: no    EKG:   No orders to display       Medications Given in the ED:   Medications   cefTRIAXone (ROCEPHIN) 1 g in sodium chloride 0.9 % 100 mL IVPB (has no administration in time range)   HYDROmorphone (DILAUDID) injection 0.5 mg (has no administration in time range)   sodium chloride 0.9 %  flush 10 mL (has no administration in time range)   sodium chloride 0.9 % flush 10 mL (has no administration in time range)   sodium chloride 0.9 % infusion 40 mL (has no administration in time range)   acetaminophen (TYLENOL) tablet 650 mg (has no administration in time range)   ondansetron (ZOFRAN) injection 4 mg (has no administration in time range)   melatonin tablet 5 mg (has no administration in time range)   HYDROmorphone (DILAUDID) injection 0.5 mg (has no administration in time range)   methylPREDNISolone sodium succinate (SOLU-Medrol) injection 40 mg (has no administration in time range)   ondansetron (ZOFRAN) injection 4 mg (4 mg Intravenous Given 12/19/22 0101)   HYDROmorphone (DILAUDID) injection 0.5 mg (0.5 mg Intravenous Given 12/19/22 0101)   sodium chloride 0.9 % bolus 250 mL (250 mL Intravenous New Bag 12/19/22 0100)       Imaging results:  CT Lumbar Spine Without Contrast    Result Date: 12/19/2022  1.  Multilevel lumbar spondylosis as described, most prominently at L3-4 through L5-S1. Of note, neural foraminal stenosis is moderate to severe bilaterally at L4-5, and severe on the left and L5-S1. Spinal canal stenosis is moderate to severe at L3-4. MRI could more accurately assess degrees of spinal canal and neural foraminal stenosis. 2.  Chronic appearing fragmented facet osteophytes on the left at L5-S1. MRI as above could further assess acuity if clinically indicated. 3.  Right-sided sacroiliitis. Mild degenerative changes of the left sacroiliac joint. Electronically signed by:  Trever Salas M.D.  12/18/2022 11:19 PM Mountain Time    XR Spine Lumbar Complete 4+VW    Result Date: 12/18/2022  1. No acute osseous abnormality. 2. Degenerative grade 1 anterolisthesis of L4 on L5 with mild degenerative changes at this level. Slot 63 Electronically signed by:  Mark Smith M.D.  12/18/2022 1:31 AM Mountain Time    XR Hip With or Without Pelvis 2 - 3 View Left    Result Date: 12/18/2022  Normal  left hip. Electronically signed by:  Josué Simpson M.D.  12/18/2022 1:31 AM Mountain Time     Social issues:   Social History     Socioeconomic History    Marital status:        NIH Stroke Scale:  Interval: (not recorded)  1a. Level of Consciousness: (not recorded)  1b. LOC Questions: (not recorded)  1c. LOC Commands: (not recorded)  2. Best Gaze: (not recorded)  3. Visual: (not recorded)  4. Facial Palsy: (not recorded)  5a. Motor Arm, Left: (not recorded)  5b. Motor Arm, Right: (not recorded)  6a. Motor Leg, Left: (not recorded)  6b. Motor Leg, Right: (not recorded)  7. Limb Ataxia: (not recorded)  8. Sensory: (not recorded)  9. Best Language: (not recorded)  10. Dysarthria: (not recorded)  11. Extinction and Inattention (formerly Neglect): (not recorded)    Total (NIH Stroke Scale): (not recorded)     Additional notable assessment information:     Nursing report ED to floor:      Pat Bliss RN   12/19/22 02:44 EST

## 2022-12-19 NOTE — CASE MANAGEMENT/SOCIAL WORK
Discharge Planning Assessment   Patricio     Patient Name: Ml Reyes  MRN: [id number]  Today's Date: 12/19/2022    Admit Date: 12/18/2022    Plan: Return home with spouse   Discharge Needs Assessment     Row Name 12/19/22 1137       Living Environment    People in Home spouse    Name(s) of People in Home Spouse-Lukasz    Current Living Arrangements home    Primary Care Provided by self    Provides Primary Care For no one    Family Caregiver if Needed spouse  Lukasz    Quality of Family Relationships helpful;supportive    Able to Return to Prior Arrangements yes       Resource/Environmental Concerns    Transportation Concerns none       Transition Planning    Patient/Family Anticipates Transition to home with family    Transportation Anticipated family or friend will provide  spouse       Discharge Needs Assessment    Readmission Within the Last 30 Days no previous admission in last 30 days    Equipment Currently Used at Home none               Discharge Plan     Row Name 12/19/22 1139       Plan    Plan Return home with spouse    Plan Comments Spoke with patient at bedside, IADL's,Confirmed PCP and Pharmacy, No transportation or prescription coverage issues.              Continued Care and Services - Admitted Since 12/18/2022    Coordination has not been started for this encounter.       Expected Discharge Date and Time     Expected Discharge Date Expected Discharge Time    Dec 20, 2022          Demographic Summary     Row Name 12/19/22 1136       General Information    Admission Type observation    Arrived From emergency department    Required Notices Provided Observation Status Notice    Referral Source emergency department    Reason for Consult discharge planning               Functional Status     Row Name 12/19/22 1136       Functional Status    Usual Activity Tolerance good    Current Activity Tolerance good       Functional Status, IADL    Meal Preparation independent    Housekeeping independent     Laundry independent    Shopping independent       Mental Status    General Appearance WDL WDL                 Patient Forms     Row Name 12/19/22 1142       Patient Forms    Patient Observation Letter Delivered  12/19 Registration         Met with patient in room wearing PPE: mask, face shield/goggles, gloves, gown.      Maintained distance greater than six feet and spent less than 15 minutes in the room.         ADDENDUM: PT suggested OP therapy , patient in agreement and open to going to [facility], referral given.    Elaine Melgoza RN

## 2022-12-19 NOTE — THERAPY EVALUATION
Patient Name: Ml Reyes  : 1950    MRN: [id number]                              Today's Date: 2022       Admit Date: 2022    Visit Dx:     ICD-10-CM ICD-9-CM   1. Acute urinary tract infection  N39.0 599.0   2. Intractable low back pain  M54.59 724.2   3. Degenerative disc disease, lumbar  M51.36 722.52   4. Acute bilateral low back pain with left-sided sciatica  M54.42 724.2     724.3   5. History of type 2 diabetes mellitus  Z86.39 V12.29   6. History of renal insufficiency  Z87.448 V13.09     Patient Active Problem List   Diagnosis   • Degeneration of lumbar intervertebral disc with acute herniation     History reviewed. No pertinent past medical history.  History reviewed. No pertinent surgical history.   General Information     Row Name 22 1500          Physical Therapy Time and Intention    Document Type evaluation  -EJ     Mode of Treatment physical therapy  -     Row Name 22 1500          General Information    Patient Profile Reviewed yes  -EJ     Prior Level of Function independent:;gait;transfer;ADL's  -EJ     Existing Precautions/Restrictions spinal;other (see comments)  Pt with LLE pain referring to L thigh.  -EJ     Barriers to Rehab physical barrier  -     Row Name 22 1500          Living Environment    People in Home spouse  Has supportive son and daughter-in-law also.  -EJ     Name(s) of People in Home Lukasz ()  -     Row Name 22 1500          Cognition    Orientation Status (Cognition) oriented x 4  -     Row Name 22 1500          Safety Issues, Functional Mobility    Impairments Affecting Function (Mobility) pain;range of motion (ROM);postural/trunk control  -EJ           User Key  (r) = Recorded By, (t) = Taken By, (c) = Cosigned By    Initials Name Provider Type    EJ Miryam Batres, PT Physical Therapist               Mobility     Row Name 22 1504          Bed Mobility    Bed Mobility bed mobility (all) activities   -     All Activities, Freedom (Bed Mobility) maximum assist (25% patient effort);2 person assist  -     Comment, (Bed Mobility) Attempted supine to sit.  Pt able to roll with mod A x1; however, she was resistive due to pain.  Got 1/2 way through almost to sitting but pt was in too much pain and had to be laid back down.  Attempted supine to sit from both L and R sides.  -     Row Name 12/19/22 1503          Bed-Chair Transfer    Bed-Chair Freedom (Transfers) not tested;unable to assess  -[facility] Name 12/19/22 1504          Sit-Stand Transfer    Sit-Stand [place] (Transfers) not tested;unable to assess  -[facility] Name 12/19/22 1504          Gait/Stairs (Locomotion)    [place] Level (Gait) unable to assess;not tested  -           User Key  (r) = Recorded By, (t) = Taken By, (c) = Cosigned By    Initials Name Provider Type     Miryam Batres, PT Physical Therapist               Obj/Interventions     Row Name 12/19/22 1506          Range of Motion Comprehensive    Comment, General Range of Motion Trunk flexion limitations due to pain.  Pt with limited movement of L hip flexion/abduction also related to pain (limited ~40%).  -[facility] Name 12/19/22 1504          Strength Comprehensive (MMT)    General Manual Muscle Testing (MMT) Assessment lower extremity strength deficits identified  -     Comment, General Manual Muscle Testing (MMT) Assessment Deferred formal MMT due to pt's pain as this is inhibiting pt's muscle activation/performance.  -[facility] Name 12/19/22 1505          Motor Skills    Additional Documentation --  LE long axis traction performed to LLE - reduced pt's pain,  Hooklying lumbar manual traction with gait belt - reduced pt's pain,  Sidelying modified positional distraction- reduced pt's pain, R sidelying lumbar flex mob w/gapping - reduced hip pain.  -     Row Name 12/19/22 1500          Sensory Assessment (Somatosensory)    Sensory Assessment (Somatosensory)  sensation intact  Per pt she denies N/T into LE.  -EJ           User Key  (r) = Recorded By, (t) = Taken By, (c) = Cosigned By    Initials Name Provider Type    Miryam Whitehead, PT Physical Therapist               Goals/Plan     Row Name 12/19/22 1531          Bed Mobility Goal 1 (PT)    Activity/Assistive Device (Bed Mobility Goal 1, PT) bed mobility activities, all  -EJ     [place] Level/Cues Needed (Bed Mobility Goal 1, PT) moderate assist (50-74% patient effort)  -EJ     Time Frame (Bed Mobility Goal 1, PT) long term goal (LTG);2 weeks  -EJ     Row Name 12/19/22 1531          Transfer Goal 1 (PT)    Activity/Assistive Device (Transfer Goal 1, PT) transfers, all;walker, rolling  -EJ     [place] Level/Cues Needed (Transfer Goal 1, PT) moderate assist (50-74% patient effort)  -EJ     Time Frame (Transfer Goal 1, PT) long term goal (LTG);2 weeks  -EJ     Row Name 12/19/22 1531          Gait Training Goal 1 (PT)    Activity/Assistive Device (Gait Training Goal 1, PT) gait (walking locomotion);walker, rolling  -EJ     [place] Level (Gait Training Goal 1, PT) minimum assist (75% or more patient effort)  -EJ     Distance (Gait Training Goal 1, PT) 20 ft  -EJ     Time Frame (Gait Training Goal 1, PT) long term goal (LTG);2 weeks  -EJ     Row Name 12/19/22 1531          Therapy Assessment/Plan (PT)    Planned Therapy Interventions (PT) bed mobility training;gait training;balance training;home exercise program;manual therapy techniques;lumbar stabilization;postural re-education;patient/family education;joint mobilization;neuromuscular re-education;ROM (range of motion);strengthening;stretching;transfer training  -EJ           User Key  (r) = Recorded By, (t) = Taken By, (c) = Cosigned By    Initials Name Provider Type    Miryam Whitehead, PT Physical Therapist               Clinical Impression     Row Name 12/19/22 1511          Pain    Pretreatment Pain Rating 9/10  -EJ     Posttreatment Pain Rating  9/10  -     Pain Location - Side/Orientation --  L sided lumbar spine mostly into lateral hip region into pt's L thigh without radiation past knee.  -     Pre/Posttreatment Pain Comment Post manual assessment/treatment pt had reduction of pain to 5/10 at rest.  Pt able to initiate rolloing with more ease and was able to flex up L hip with less pain.  -     Pain Intervention(s) Repositioned;Therapeutic presence;Therapeutic touch;Emotional support;Nursing Notified  -     Row Name 12/19/22 1511          Plan of Care Review    Plan of Care Reviewed With patient;family  -     Outcome Evaluation Patient is a 73 y/o F who was admitted 12/18/22 with c/o left hip pain, low back pain.  Patient reports this began on Thursday initially with a little pain.  On Friday her pain worsened as she was doing more sitting as she went to a [employer] Dinner show.  She had a hard time getting comfortable in the chairs.  She likes to sit up in her bed in the evening and play games, and she noticed this seated flexed position was painful to her.  Standing was more comfortable, but then she got worse to where she was having difficulty getting OOB.  EMS was called and they brought her to [facility] ED.  At time of eval she denies any new direct trauma, injury, or fall.  She reports that the pain is in the left lower back down the left hip and into her L thigh/knee region, does not go past her knee.  Denies any weakness, no urinary retention, bowel or bladder incontinence.  Denies any N/T.  Pt has not been OOB since she got to hospital and she is afraid to move.  During today's evaluation attempts were made to assist pt supine to sit from both left and ride sides of the bed with safe log rolling.  This was unsuccessful due to her pain.  She strongly resisted and needed to lay back down as her pain went to a 10/10.  Lumbar spine MRI reviewed and PT went forward with assessment of spine to help with gentle mobilization.  Trialed LLE long axis  distraction as well as a modified positional traction for pt to trial laying in.  Performed hooklying gapping with gait belts.  Instructed pt and family in LLE long axis pull as pt felt relief.  Pt then performed LTR x10 to the R only and was able to improve her LLE AROM.  She was able to initiate roll to the R, and this was improvement as compared to the beginning of the session.  Pt still not able to sit EOB this date.  PT will continue to follow pt in acute setting.  At d/c recommend home with OPPT.  -     Row Name 12/19/22 1511          Therapy Assessment/Plan (PT)    Criteria for Skilled Interventions Met (PT) yes;skilled treatment is necessary  -EJ     Therapy Frequency (PT) 5 times/wk  -EJ     Predicted Duration of Therapy Intervention (PT) until discharge  -     Row Name 12/19/22 1511          Positioning and Restraints    Pre-Treatment Position in bed  -EJ     Post Treatment Position bed  -EJ     In Bed supine;exit alarm on;call light within reach;encouraged to call for assist;with family/caregiver;with nsg  -EJ           User Key  (r) = Recorded By, (t) = Taken By, (c) = Cosigned By    Initials Name Provider Type    EJ Miryam Batres, PT Physical Therapist               Outcome Measures     Row Name 12/19/22 1532 12/19/22 0810       How much help from another person do you currently need...    Turning from your back to your side while in flat bed without using bedrails? 2  -EJ 1  -GK    Moving from lying on back to sitting on the side of a flat bed without bedrails? 1  -EJ 1  -GK    Moving to and from a bed to a chair (including a wheelchair)? 1  -EJ 1  -GK    Standing up from a chair using your arms (e.g., wheelchair, bedside chair)? 1  -EJ 1  -GK    Climbing 3-5 steps with a railing? 1  -EJ 1  -GK    To walk in hospital room? 1  -EJ 1  -GK    AM-PAC 6 Clicks Score (PT) 7  -EJ 6  -GK    Highest level of mobility 2 --> Bed activities/dependent transfer  -EJ 2 --> Bed activities/dependent transfer  -GK     Row Name 12/19/22 0350          How much help from another person do you currently need...    Turning from your back to your side while in flat bed without using bedrails? 1  -SL     Moving from lying on back to sitting on the side of a flat bed without bedrails? 1  -SL     Moving to and from a bed to a chair (including a wheelchair)? 1  -SL     Standing up from a chair using your arms (e.g., wheelchair, bedside chair)? 1  -SL     Climbing 3-5 steps with a railing? 1  -SL     To walk in hospital room? 1  -SL     AM-PAC 6 Clicks Score (PT) 6  -SL     Highest level of mobility 2 --> Bed activities/dependent transfer  -SL     Row Name 12/19/22 1532          Functional Assessment    Outcome Measure Options AM-PAC 6 Clicks Basic Mobility (PT)  -           User Key  (r) = Recorded By, (t) = Taken By, (c) = Cosigned By    Initials Name Provider Type    Miryam Whitehead, PT Physical Therapist    Gwendolyn Staley RN Registered Nurse    Sosa Whittaker RN Registered Nurse                             Physical Therapy Education     Title: PT OT SLP Therapies (Done)     Topic: Physical Therapy (Done)     Point: Mobility training (Done)     Learning Progress Summary           Patient Acceptance, E,TB, VU by GUALBERTO at 12/19/2022 1533   Family Acceptance, E,TB, VU by GUALBERTO at 12/19/2022 1533                   Point: Home exercise program (Done)     Learning Progress Summary           Patient Acceptance, E,TB, VU by GUALBERTO at 12/19/2022 1533   Family Acceptance, E,TB, VU by GUALBERTO at 12/19/2022 1533                   Point: Body mechanics (Done)     Learning Progress Summary           Patient Acceptance, E,TB, VU by GUALBERTO at 12/19/2022 1533   Family Acceptance, E,TB, VU by GUALBERTO at 12/19/2022 1533                   Point: Precautions (Done)     Learning Progress Summary           Patient Acceptance, E,TB, VU by GUALBERTO at 12/19/2022 1533   Family Acceptance, E,TB, VU by GUALBERTO at 12/19/2022 1533                               User Key      Initials Effective Dates Name Provider Type Discipline     06/16/21 -  Miryam Batres, PT Physical Therapist PT              PT Recommendation and Plan  Planned Therapy Interventions (PT): bed mobility training, gait training, balance training, home exercise program, manual therapy techniques, lumbar stabilization, postural re-education, patient/family education, joint mobilization, neuromuscular re-education, ROM (range of motion), strengthening, stretching, transfer training  Plan of Care Reviewed With: patient, family  Outcome Evaluation: Patient is a 73 y/o F who was admitted 12/18/22 with c/o left hip pain, low back pain.  Patient reports this began on Thursday initially with a little pain.  On Friday her pain worsened as she was doing more sitting as she went to a [employer]inner show.  She had a hard time getting comfortable in the chairs.  She likes to sit up in her bed in the evening and play games, and she noticed this seated flexed position was painful to her.  Standing was more comfortable, but then she got worse to where she was having difficulty getting OOB.  EMS was called and they brought her to [facility] ED.  At time of eval she denies any new direct trauma, injury, or fall.  She reports that the pain is in the left lower back down the left hip and into her L thigh/knee region, does not go past her knee.  Denies any weakness, no urinary retention, bowel or bladder incontinence.  Denies any N/T.  Pt has not been OOB since she got to hospital and she is afraid to move.  During today's evaluation attempts were made to assist pt supine to sit from both left and ride sides of the bed with safe log rolling.  This was unsuccessful due to her pain.  She strongly resisted and needed to lay back down as her pain went to a 10/10.  Lumbar spine MRI reviewed and PT went forward with assessment of spine to help with gentle mobilization.  Trialed LLE long axis distraction as well as a modified positional traction for pt  to trial laying in.  Performed hooklying gapping with gait belts.  Instructed pt and family in LLE long axis pull as pt felt relief.  Pt then performed LTR x10 to the R only and was able to improve her LLE AROM.  She was able to initiate roll to the R, and this was improvement as compared to the beginning of the session.  Pt still not able to sit EOB this date.  PT will continue to follow pt in acute setting.  At d/c recommend home with OPPT.     Time Calculation:    PT Charges     Row Name 12/19/22 1535             Time Calculation    Start Time 1352  -EJ      Stop Time 1443  -EJ      Time Calculation (min) 51 min  -EJ      PT Received On 12/19/22  -EJ      PT - Next Appointment 12/20/22  -EJ      PT Goal Re-Cert Due Date 01/02/23  -EJ         Time Calculation- PT    Total Timed Code Minutes- PT 0 minute(s)  -EJ            User Key  (r) = Recorded By, (t) = Taken By, (c) = Cosigned By    Initials Name Provider Type     Miryam Batres, PT Physical Therapist              Therapy Charges for Today     Code Description Service Date Service Provider Modifiers Qty    [id number] HC-PT EVAL MOD COMPLEXITY 5 12/19/2022 Miryam Batres, PT  1          PT G-Codes  Outcome Measure Options: AM-PAC 6 Clicks Basic Mobility (PT)  AM-PAC 6 Clicks Score (PT): 7  PT Discharge Summary  Anticipated Discharge Disposition (PT): home with assist, home with outpatient therapy services    Miryam Batres, PT  12/19/2022

## 2022-12-19 NOTE — PLAN OF CARE
Goal Outcome Evaluation:  Plan of Care Reviewed With: patient, family           Outcome Evaluation: Patient is a 71 y/o F who was admitted 12/18/22 with c/o left hip pain, low back pain.  Patient reports this began on Thursday initially with a little pain.  On Friday her pain worsened as she was doing more sitting as she went to a [employer]inner show.  She had a hard time getting comfortable in the chairs.  She likes to sit up in her bed in the evening and play games, and she noticed this seated flexed position was painful to her.  Standing was more comfortable, but then she got worse to where she was having difficulty getting OOB.  EMS was called and they brought her to [facility] ED.  At time of eval she denies any new direct trauma, injury, or fall.  She reports that the pain is in the left lower back down the left hip and into her L thigh/knee region, does not go past her knee.  Denies any weakness, no urinary retention, bowel or bladder incontinence.  Denies any N/T.  Pt has not been OOB since she got to hospital and she is afraid to move.  During today's evaluation attempts were made to assist pt supine to sit from both left and ride sides of the bed with safe log rolling.  This was unsuccessful due to her pain.  She strongly resisted and needed to lay back down as her pain went to a 10/10.  Lumbar spine MRI reviewed and PT went forward with assessment of spine to help with gentle mobilization.  Trialed LLE long axis distraction as well as a modified positional traction for pt to trial laying in.  Performed hooklying gapping with gait belts.  Instructed pt and family in LLE long axis pull as pt felt relief.  Pt then performed LTR x10 to the R only and was able to improve her LLE AROM.  She was able to initiate roll to the R, and this was improvement as compared to the beginning of the session.  Pt still not able to sit EOB this date.  PT will continue to follow pt in acute setting.  At d/c recommend home with OPPT.

## 2022-12-19 NOTE — ED PROVIDER NOTES
Subjective   History of Present Illness  72-year-old female returns after being seen yesterday in the emergency department.  She states that she has had severe low back pain radiating down the posterior lateral aspect of her left leg.  She was told she had sciatica on a previous visit.  The patient been taking statins and again without improvement.  She states that she had received tramadol in the ER previously but the pain never completely went away.  Patient is noted to be a diabetic and also has had issues in the past with renal insufficiency and is concerned about potential NSAID use.  The patient reports no advanced imaging of her back.  She reports no fever or chills.  Reports no direct impact trauma.  She reports no areas of anesthesia she reports no change in bowel or bladder habit        Review of Systems   Constitutional: Positive for fatigue. Negative for chills, diaphoresis and fever.   HENT: Negative for sore throat and trouble swallowing.    Eyes: Negative for pain.   Respiratory: Negative for chest tightness and shortness of breath.    Cardiovascular: Negative for chest pain.   Gastrointestinal: Negative for abdominal pain, anal bleeding, blood in stool, diarrhea, nausea, rectal pain and vomiting.   Endocrine: Positive for polydipsia and polyuria.   Musculoskeletal: Positive for back pain. Negative for neck pain and neck stiffness.   Neurological: Negative for numbness and headaches.   All other systems reviewed and are negative.      No past medical history on file.    No Known Allergies    No past surgical history on file.    No family history on file.    Social History     Socioeconomic History   • Marital status:      No recent unusual food water travel or activity      Objective   Physical Exam  Alert Diana Coma Scale 15   HEENT: Pupils equal and reactive to light. Conjunctivae are not injected. Normal tympanic membranes. Oropharynx and nares are normal.   Neck: Supple. Midline trachea.  No JVD. No goiter.   Chest: Clear and equal breath sounds bilaterally, regular rate and rhythm without murmur or rub.   Abdomen: Positive bowel sounds, nontender, nondistended. No rebound or peritoneal signs. No CVA tenderness.   Back: The patient has left-sided lumbar paraspinal discomfort there is some discomfort in the area of the left SI joint but no discrete left SI joint tenderness straight leg raising test is negative on the right and does not provoke radicular pain on the left but patient does states she has some numbness within the legs raised  Extremities no clubbing. cyanosis or edema. Motor sensory exam is normal. The full range of motion is intact   Skin: Warm and dry, no rashes or petechia.   Lymphatic: No regional lymphadenopathy. No calf pain, swelling or Homans sign    Procedures           ED Course      Labs Reviewed   COMPREHENSIVE METABOLIC PANEL - Abnormal; Notable for the following components:       Result Value    Glucose 190 (*)     Creatinine 1.17 (*)     eGFR 50.0 (*)     All other components within normal limits    Narrative:     GFR Normal >60  Chronic Kidney Disease <60  Kidney Failure <15    The GFR formula is only valid for adults with stable renal function between ages 18 and 70.   URINALYSIS W/ CULTURE IF INDICATED - Abnormal; Notable for the following components:    Ketones, UA 15 mg/dL (1+) (*)     Blood, UA Trace (*)     Protein, UA 30 mg/dL (1+) (*)     Leuk Esterase, UA Trace (*)     Nitrite, UA Positive (*)     All other components within normal limits    Narrative:     In absence of clinical symptoms, the presence of pyuria, bacteria, and/or nitrites on the urinalysis result does not correlate with infection.   CK - Abnormal; Notable for the following components:    Creatine Kinase 230 (*)     All other components within normal limits   CBC WITH AUTO DIFFERENTIAL - Abnormal; Notable for the following components:    Hemoglobin 10.5 (*)     MCH 25.8 (*)     MCHC 30.7 (*)      Neutrophil % 78.9 (*)     Lymphocyte % 14.8 (*)     Eosinophil % 0.0 (*)     All other components within normal limits   URINALYSIS, MICROSCOPIC ONLY - Abnormal; Notable for the following components:    RBC, UA 0-2 (*)     WBC, UA 13-20 (*)     Bacteria, UA 4+ (*)     All other components within normal limits   URINE CULTURE   POCT GLUCOSE FINGERSTICK   POCT GLUCOSE FINGERSTICK   POCT GLUCOSE FINGERSTICK   POCT GLUCOSE FINGERSTICK   CBC AND DIFFERENTIAL    Narrative:     The following orders were created for panel order CBC & Differential.  Procedure                               Abnormality         Status                     ---------                               -----------         ------                     CBC Auto Differential[[id number]]        Abnormal            Final result                 Please view results for these tests on the individual orders.     Medications   cefTRIAXone (ROCEPHIN) 1 g in sodium chloride 0.9 % 100 mL IVPB (has no administration in time range)   HYDROmorphone (DILAUDID) injection 0.5 mg (has no administration in time range)   sodium chloride 0.9 % flush 10 mL (has no administration in time range)   sodium chloride 0.9 % flush 10 mL (has no administration in time range)   sodium chloride 0.9 % infusion 40 mL (has no administration in time range)   acetaminophen (TYLENOL) tablet 650 mg (has no administration in time range)   ondansetron (ZOFRAN) injection 4 mg (has no administration in time range)   melatonin tablet 5 mg (has no administration in time range)   HYDROmorphone (DILAUDID) injection 0.5 mg (has no administration in time range)   methylPREDNISolone sodium succinate (SOLU-Medrol) injection 40 mg (has no administration in time range)   ondansetron (ZOFRAN) injection 4 mg (4 mg Intravenous Given 12/19/22 0101)   HYDROmorphone (DILAUDID) injection 0.5 mg (0.5 mg Intravenous Given 12/19/22 0101)   sodium chloride 0.9 % bolus 250 mL (250 mL Intravenous New Bag 12/19/22 0100)      CT Lumbar Spine Without Contrast    Result Date: 12/19/2022  1.  Multilevel lumbar spondylosis as described, most prominently at L3-4 through L5-S1. Of note, neural foraminal stenosis is moderate to severe bilaterally at L4-5, and severe on the left and L5-S1. Spinal canal stenosis is moderate to severe at L3-4. MRI could more accurately assess degrees of spinal canal and neural foraminal stenosis. 2.  Chronic appearing fragmented facet osteophytes on the left at L5-S1. MRI as above could further assess acuity if clinically indicated. 3.  Right-sided sacroiliitis. Mild degenerative changes of the left sacroiliac joint. Electronically signed by:  Trever Salas M.D.  12/18/2022 11:19 PM Mountain Time    XR Spine Lumbar Complete 4+VW    Result Date: 12/18/2022  1. No acute osseous abnormality. 2. Degenerative grade 1 anterolisthesis of L4 on L5 with mild degenerative changes at this level. Slot 63 Electronically signed by:  Mark Smith M.D.  12/18/2022 1:31 AM Mountain Time    XR Hip With or Without Pelvis 2 - 3 View Left    Result Date: 12/18/2022  Normal left hip. Electronically signed by:  Josué Simpson M.D.  12/18/2022 1:31 AM Mountain Time                                         MDM  Number of Diagnoses or Management Options     Amount and/or Complexity of Data Reviewed  Clinical lab tests: reviewed and ordered  Tests in the radiology section of CPT®: reviewed and ordered  Independent visualization of images, tracings, or specimens: yes    Risk of Complications, Morbidity, and/or Mortality  Presenting problems: high  Diagnostic procedures: high  Management options: high  General comments: Patient had improvement in discomfort with ER therapy.  Patient reported significant mobility challenges at home and is concerned about the lack of response to outpatient management.  The patient will be placed in observation.  We will initiate low-dose steroid therapy and follow blood sugars closely.  The patient  will likely need MRI as recommended by the radiologist for definitive evaluation given the severity of the findings.     Patient Progress  Patient progress: improved      Final diagnoses:   Acute urinary tract infection   Intractable low back pain   Degenerative disc disease, lumbar   Acute bilateral low back pain with left-sided sciatica   History of type 2 diabetes mellitus   History of renal insufficiency       ED Disposition  ED Disposition     ED Disposition   Decision to Admit    Condition   --    Comment   --             No follow-up provider specified.       Medication List      No changes were made to your prescriptions during this visit.          William Dominique MD  12/19/22 [id number]

## 2022-12-19 NOTE — PLAN OF CARE
Goal Outcome Evaluation:           Progress: improving  Outcome Evaluation: Pt is getting prn pain meds. MRI pending result. IVFs. PT and CM following. Fall risk maintained. Getting IV steroids, blood sugar closely monitored. VSS. Will continue follow plan of care.

## 2022-12-19 NOTE — H&P
[facility] H&P    Patient Name: Ml Reyes  : 1950  MRN: [id number]  Primary Care Physician: Evita Keith APRN  Date of admission: 2022     Patient Care Team:  Evita Keith APRN as PCP - General (Nurse Practitioner)          Subjective   History Present Illness     Chief Complaint:   Chief Complaint   Patient presents with   • Sciatica     Pt reports increased pain in L lower back and L hip that radiates into LLE.  Pt denies any loss of bowel/bladder or saddle anesthesia.      Sciatica    Ms. Reyes is a 72 y.o.  presents to [facility] complaining of sciatica      History of Present Illness    ED 22: 72-year-old female returns after being seen yesterday in the emergency department.  She states that she has had severe low back pain radiating down the posterior lateral aspect of her left leg.  She was told she had sciatica on a previous visit.  The patient been taking statins and again without improvement.  She states that she had received tramadol in the ER previously but the pain never completely went away.  Patient is noted to be a diabetic and also has had issues in the past with renal insufficiency and is concerned about potential NSAID use.  The patient reports no advanced imaging of her back.  She reports no fever or chills.  Reports no direct impact trauma.  She reports no areas of anesthesia she reports no change in bowel or bladder habit    OBS 22: Patient is a 72-year-old female presenting to the hospital with low back pain that radiates to her left hip and down to her left leg above her kneecap.  Patient reports she has had previous sciatic pain in the past but none that is caused her not to be able to walk.  Patient reports being a diabetic but does not have any neuropathy issues.  Patient reports no previous imaging in the past.  Patient denies numbness or paresthesis.  Denies loss of bowel or bladder function.  Patient reports 5/10 when  laying in bed and 10/10 when standing. Some relief with pain medication.  Patient denies use of blood thinners, history of blood clot or recent falls or injury.    Review of Systems   HENT: Negative.    Eyes: Negative.    Cardiovascular: Negative.    Respiratory: Negative.    Endocrine: Negative.    Hematologic/Lymphatic: Negative.    Skin: Negative.    Musculoskeletal: Positive for back pain, joint pain and stiffness.   Gastrointestinal: Negative.    Genitourinary: Negative.    Neurological: Positive for weakness.   Psychiatric/Behavioral: Negative.    Allergic/Immunologic: Negative.            Personal History     Past Medical History: History reviewed. No pertinent past medical history.    Surgical History:    History reviewed. No pertinent surgical history.        Family History: family history is not on file. Otherwise pertinent FHx was reviewed and unremarkable.     Social History:  reports that she has never smoked. She has never used smokeless tobacco.      Medications:  Prior to Admission medications    Medication Sig Start Date End Date Taking? Authorizing Provider   acetaminophen (TYLENOL) 325 MG tablet Take 650 mg by mouth Every 6 (Six) Hours As Needed for Mild Pain.   Yes Alli Hong MD   amLODIPine (NORVASC) 10 MG tablet Take 10 mg by mouth Every Night.   Yes Alli Hong MD   atorvastatin (LIPITOR) 20 MG tablet Take 40 mg by mouth Every Night.   Yes Alli Hong MD   Calcium-Ergocalciferol 250-2.5 MG-MCG tablet Take 250 mg by mouth 1 (One) Time Per Week. On Monday at 8pm   Yes Alli Hong MD   carvedilol (COREG) 3.125 MG tablet Take 3.125 mg by mouth 2 (Two) Times a Day With Meals.   Yes Alli Hong MD   hydrALAZINE (APRESOLINE) 10 MG tablet Take 10 mg by mouth Every Night. States she takes 20 mg in the am and 10mg at hs   Yes Alli Hong MD   insulin detemir (LEVEMIR) 100 UNIT/ML injection Inject 100 Units under the skin into the appropriate  area as directed Every Night.   Yes Alli Hong MD   Insulin Lispro (humaLOG) 100 UNIT/ML injection Inject 30 Units under the skin into the appropriate area as directed 3 (Three) Times a Day Before Meals.   Yes Alli Hnog MD   levothyroxine (SYNTHROID, LEVOTHROID) 100 MCG tablet Take 100 mcg by mouth Daily.   Yes Alli Hong MD   lidocaine (LIDODERM) 5 % Place 1 patch on the skin as directed by provider Daily. Remove & Discard patch within 12 hours or as directed by MD 12/18/22  Yes Shirley Graham APRN   olmesartan (BENICAR) 20 MG tablet Take 40 mg by mouth Daily.   Yes Alli Hong MD   TiZANidine (ZANAFLEX) 2 MG capsule Take 1 capsule by mouth 3 (Three) Times a Day. 12/18/22  Yes Shirley Graham APRN   hydrALAZINE (APRESOLINE) 10 MG tablet Take 20 mg by mouth Every Morning. Pt states she takes 20mg in the morning at 10mg at hs    Alli Hong MD       Allergies:  No Known Allergies    Objective   Objective     Vital Signs  Temp:  [97.5 °F (36.4 °C)-98.5 °F (36.9 °C)] 97.9 °F (36.6 °C)  Heart Rate:  [69-90] 69  Resp:  [16] 16  BP: (117-171)/(66-76) 117/66  SpO2:  [96 %-100 %] 99 %  on   ;   Device (Oxygen Therapy): room air  Body mass index is 28.62 kg/m².    Physical Exam  Vitals and nursing note reviewed.   Constitutional:       Appearance: Normal appearance.   HENT:      Head: Normocephalic and atraumatic.      Right Ear: External ear normal.      Left Ear: External ear normal.      Nose: Nose normal.      Mouth/Throat:      Mouth: Mucous membranes are moist.      Pharynx: Oropharynx is clear.   Eyes:      Extraocular Movements: Extraocular movements intact.      Conjunctiva/sclera: Conjunctivae normal.      Pupils: Pupils are equal, round, and reactive to light.   Cardiovascular:      Rate and Rhythm: Normal rate and regular rhythm.      Pulses: Normal pulses.      Heart sounds: Normal heart sounds.   Pulmonary:      Effort: Pulmonary effort  is normal.      Breath sounds: Normal breath sounds.   Abdominal:      General: Bowel sounds are normal.      Palpations: Abdomen is soft.   Musculoskeletal:         General: Tenderness present.      Cervical back: Normal range of motion.   Neurological:      General: No focal deficit present.      Mental Status: She is alert and oriented to person, place, and time.      Motor: Weakness present.      Gait: Gait abnormal.   Psychiatric:         Mood and Affect: Mood normal.         Behavior: Behavior normal.         Thought Content: Thought content normal.         Judgment: Judgment normal.           Results Review:  I have personally reviewed most recent cardiac tracings, lab results, microbiology results and radiology images and interpretations and agree with findings, most notably: Cbc, CMP, CK, x-ray lumbar spine, CT lumbar, MRI spine.    Results from last 7 days   Lab Units 12/19/22  0102   WBC 10*3/mm3 8.60   HEMOGLOBIN g/dL 10.5*   HEMATOCRIT % 34.4   PLATELETS 10*3/mm3 269     Results from last 7 days   Lab Units 12/19/22  0102   SODIUM mmol/L 138   POTASSIUM mmol/L 3.8   CHLORIDE mmol/L 105   CO2 mmol/L 22.0   BUN mg/dL 23   CREATININE mg/dL 1.17*   GLUCOSE mg/dL 190*   CALCIUM mg/dL 9.2   ALT (SGPT) U/L 10   AST (SGOT) U/L 12     Estimated Creatinine Clearance: 52.1 mL/min (A) (by C-G formula based on SCr of 1.17 mg/dL (H)).  Brief Urine Lab Results  (Last result in the past 365 days)      Color   Clarity   Blood   Leuk Est   Nitrite   Protein   CREAT   Urine HCG        12/19/22 0203 Yellow   Clear   Trace   Trace   Positive   30 mg/dL (1+)                 Microbiology Results (last 10 days)     ** No results found for the last 240 hours. **          ECG/EMG Results (most recent)     None                  CT Lumbar Spine Without Contrast    Result Date: 12/19/2022  1.  Multilevel lumbar spondylosis as described, most prominently at L3-4 through L5-S1. Of note, neural foraminal stenosis is moderate to severe  bilaterally at L4-5, and severe on the left and L5-S1. Spinal canal stenosis is moderate to severe at L3-4. MRI could more accurately assess degrees of spinal canal and neural foraminal stenosis. 2.  Chronic appearing fragmented facet osteophytes on the left at L5-S1. MRI as above could further assess acuity if clinically indicated. 3.  Right-sided sacroiliitis. Mild degenerative changes of the left sacroiliac joint. Electronically signed by:  Trever Salas M.D.  12/18/2022 11:19 PM Mountain Time    MRI Lumbar Spine Without Contrast    Result Date: 12/19/2022  1. Grade 1 anterolisthesis of L4 on L5 measuring 6 mm related to facet arthritis. 2. Multilevel lumbar degenerative findings above most pronounced at L4-5 and L5-S1. 3. Negative for acute osseous abnormality.  Electronically Signed By-Arpan Crespo MD On:12/19/2022 11:23 AM This report was finalized on [id number] by  Arpan Crespo MD.    XR Spine Lumbar Complete 4+VW    Result Date: 12/18/2022  1. No acute osseous abnormality. 2. Degenerative grade 1 anterolisthesis of L4 on L5 with mild degenerative changes at this level. Slot 63 Electronically signed by:  Mrak Smith M.D.  12/18/2022 1:31 AM Mountain Time    XR Hip With or Without Pelvis 2 - 3 View Left    Result Date: 12/18/2022  Normal left hip. Electronically signed by:  Josué Simpson M.D.  12/18/2022 1:31 AM Mountain Time        Estimated Creatinine Clearance: 52.1 mL/min (A) (by C-G formula based on SCr of 1.17 mg/dL (H)).    Assessment & Plan   Assessment/Plan       Active Hospital Problems    Diagnosis  POA   • **Degeneration of lumbar intervertebral disc with acute herniation [M51.36, M51.26]  Yes      Resolved Hospital Problems   No resolved problems to display.     Degeneration of lumbar intervertebral disc with acute herniation   -Physical therapy consult  -Case management consult  -  -X-ray lumbar spine shows no acute osseous abnormality, degenerative grade 1 anterolisthesis  of L4 on L5 with mild degenerative changes  -CT lumbar spine shows multilevel lumbar spondylosis most probably L3-4 through L5-S1, neural foraminal stenosis at moderate to severe bilateral L4-5 and severe in the left and L5-S1; chronic fragmented fossa osteophytes in the left L5-S1; mild degenerative change of the left sacroiliac joint  -MRI lumbar spine shows grade 1 anterolisthesis of L4 on L5 measuring 6 mm related to facet arthritis, multilevel lumbar degenerative findings most pronounced at L4-5 and L5-S1 negative for acute abnormalities  -IV steroids  -IV/oral analgesics as needed  -Continue zanaflex and lidocaine   -Back brace application    Acute urinary tract infection without hematuria  -Urinalysis shows ketones, trace blood, positive nitrates, leukocytes, protein, 0-2 RBC, 13-20 WBC, 4+ bacteria  -Urine culture pending  -IV fluids  -IV Rocephin empirically    Diabetes mellitus type 2  -Hold p.o. diabetic medication  -Monitor blood glucose before meals and at bedtime  -Initiate sliding scale insulin  -Continue Lantus    Chronic essential hypertension  - Continue amlodipine, atorvastatin, carvedilol, hydralazine, and olmesartan  - Continuous cardiac monitoring    Hypothyroidism  -Continue levothyroxine    VTE Prophylaxis -   Mechanical Order History:      Ordered        12/19/22 0736  Place Sequential Compression Device  Once            12/19/22 0736  Maintain Sequential Compression Device  Continuous            12/19/22 0236  Place Sequential Compression Device  Once            12/19/22 0236  Maintain Sequential Compression Device  Continuous                    Pharmalogical Order History:     None          CODE STATUS:    Code Status and Medical Interventions:   Ordered at: 12/19/22 0736     Level Of Support Discussed With:    Patient     Code Status (Patient has no pulse and is not breathing):    CPR (Attempt to Resuscitate)     Medical Interventions (Patient has pulse or is breathing):    Full Support        This patient has been examined wearing personal protective equipment.     I discussed the patient's findings and my recommendations with patient, family, nursing staff, primary care team and consulting provider.      Signature: Electronically signed by SPENCER Cruz, 12/19/22, 4:03 PM EST.

## 2022-12-20 LAB
ANION GAP SERPL CALCULATED.3IONS-SCNC: 11 MMOL/L (ref 5–15)
BUN SERPL-MCNC: 39 MG/DL (ref 8–23)
BUN/CREAT SERPL: 25.7 (ref 7–25)
CALCIUM SPEC-SCNC: 8.9 MG/DL (ref 8.6–10.5)
CHLORIDE SERPL-SCNC: 104 MMOL/L (ref 98–107)
CK SERPL-CCNC: 177 U/L (ref 20–180)
CO2 SERPL-SCNC: 21 MMOL/L (ref 22–29)
CREAT SERPL-MCNC: 1.52 MG/DL (ref 0.57–1)
EGFRCR SERPLBLD CKD-EPI 2021: 36.5 ML/MIN/1.73
GLUCOSE BLDC GLUCOMTR-MCNC: 147 MG/DL (ref 70–105)
GLUCOSE BLDC GLUCOMTR-MCNC: 252 MG/DL (ref 70–105)
GLUCOSE BLDC GLUCOMTR-MCNC: 258 MG/DL (ref 70–105)
GLUCOSE SERPL-MCNC: 318 MG/DL (ref 65–99)
POTASSIUM SERPL-SCNC: 4.6 MMOL/L (ref 3.5–5.2)
SODIUM SERPL-SCNC: 136 MMOL/L (ref 136–145)

## 2022-12-20 PROCEDURE — 96376 TX/PRO/DX INJ SAME DRUG ADON: CPT

## 2022-12-20 PROCEDURE — 82550 ASSAY OF CK (CPK): CPT | Performed by: NURSE PRACTITIONER

## 2022-12-20 PROCEDURE — 80048 BASIC METABOLIC PNL TOTAL CA: CPT | Performed by: NURSE PRACTITIONER

## 2022-12-20 PROCEDURE — G0378 HOSPITAL OBSERVATION PER HR: HCPCS

## 2022-12-20 PROCEDURE — 63710000001 INSULIN GLARGINE PER 5 UNITS: Performed by: NURSE PRACTITIONER

## 2022-12-20 PROCEDURE — 25010000002 HYDROMORPHONE 1 MG/ML SOLUTION: Performed by: NURSE PRACTITIONER

## 2022-12-20 PROCEDURE — 96361 HYDRATE IV INFUSION ADD-ON: CPT

## 2022-12-20 PROCEDURE — 25010000002 METHYLPREDNISOLONE PER 40 MG: Performed by: NURSE PRACTITIONER

## 2022-12-20 PROCEDURE — 97140 MANUAL THERAPY 1/> REGIONS: CPT | Performed by: PHYSICAL THERAPIST

## 2022-12-20 PROCEDURE — 96366 THER/PROPH/DIAG IV INF ADDON: CPT

## 2022-12-20 PROCEDURE — 25010000002 CEFTRIAXONE PER 250 MG: Performed by: EMERGENCY MEDICINE

## 2022-12-20 PROCEDURE — 63710000001 INSULIN LISPRO (HUMAN) PER 5 UNITS: Performed by: NURSE PRACTITIONER

## 2022-12-20 PROCEDURE — 97530 THERAPEUTIC ACTIVITIES: CPT | Performed by: PHYSICAL THERAPIST

## 2022-12-20 PROCEDURE — 82962 GLUCOSE BLOOD TEST: CPT

## 2022-12-20 RX ORDER — METHYLPREDNISOLONE SODIUM SUCCINATE 40 MG/ML
40 INJECTION, POWDER, LYOPHILIZED, FOR SOLUTION INTRAMUSCULAR; INTRAVENOUS EVERY 12 HOURS
Status: DISCONTINUED | OUTPATIENT
Start: 2022-12-20 | End: 2022-12-21

## 2022-12-20 RX ORDER — INSULIN LISPRO 100 [IU]/ML
30 INJECTION, SOLUTION INTRAVENOUS; SUBCUTANEOUS
Status: DISCONTINUED | OUTPATIENT
Start: 2022-12-20 | End: 2022-12-21 | Stop reason: HOSPADM

## 2022-12-20 RX ADMIN — AMLODIPINE BESYLATE 10 MG: 5 TABLET ORAL at 20:24

## 2022-12-20 RX ADMIN — INSULIN LISPRO 12 UNITS: 100 INJECTION, SOLUTION INTRAVENOUS; SUBCUTANEOUS at 07:32

## 2022-12-20 RX ADMIN — Medication 10 ML: at 14:28

## 2022-12-20 RX ADMIN — Medication 10 ML: at 09:35

## 2022-12-20 RX ADMIN — CEFTRIAXONE 1 G: 1 INJECTION, POWDER, FOR SOLUTION INTRAMUSCULAR; INTRAVENOUS at 03:58

## 2022-12-20 RX ADMIN — INSULIN LISPRO 12 UNITS: 100 INJECTION, SOLUTION INTRAVENOUS; SUBCUTANEOUS at 11:17

## 2022-12-20 RX ADMIN — INSULIN LISPRO 30 UNITS: 100 INJECTION, SOLUTION INTRAVENOUS; SUBCUTANEOUS at 13:13

## 2022-12-20 RX ADMIN — TRAMADOL HYDROCHLORIDE 50 MG: 50 TABLET, COATED ORAL at 04:14

## 2022-12-20 RX ADMIN — TIZANIDINE 4 MG: 4 TABLET ORAL at 22:46

## 2022-12-20 RX ADMIN — HYDRALAZINE HYDROCHLORIDE 10 MG: 10 TABLET, FILM COATED ORAL at 20:24

## 2022-12-20 RX ADMIN — INSULIN GLARGINE 100 UNITS: 100 INJECTION, SOLUTION SUBCUTANEOUS at 20:24

## 2022-12-20 RX ADMIN — METHYLPREDNISOLONE SODIUM SUCCINATE 40 MG: 40 INJECTION, POWDER, FOR SOLUTION INTRAMUSCULAR; INTRAVENOUS at 11:17

## 2022-12-20 RX ADMIN — HYDROMORPHONE HYDROCHLORIDE 1 MG: 1 INJECTION, SOLUTION INTRAMUSCULAR; INTRAVENOUS; SUBCUTANEOUS at 14:28

## 2022-12-20 RX ADMIN — SODIUM CHLORIDE 100 ML/HR: 9 INJECTION, SOLUTION INTRAVENOUS at 13:13

## 2022-12-20 RX ADMIN — HYDRALAZINE HYDROCHLORIDE 20 MG: 10 TABLET, FILM COATED ORAL at 09:01

## 2022-12-20 RX ADMIN — INSULIN LISPRO 30 UNITS: 100 INJECTION, SOLUTION INTRAVENOUS; SUBCUTANEOUS at 18:35

## 2022-12-20 RX ADMIN — METHYLPREDNISOLONE SODIUM SUCCINATE 40 MG: 40 INJECTION, POWDER, FOR SOLUTION INTRAMUSCULAR; INTRAVENOUS at 03:57

## 2022-12-20 RX ADMIN — CARVEDILOL 3.12 MG: 3.12 TABLET, FILM COATED ORAL at 18:35

## 2022-12-20 RX ADMIN — Medication 10 ML: at 20:24

## 2022-12-20 RX ADMIN — HYDROMORPHONE HYDROCHLORIDE 1 MG: 1 INJECTION, SOLUTION INTRAMUSCULAR; INTRAVENOUS; SUBCUTANEOUS at 07:39

## 2022-12-20 RX ADMIN — TIZANIDINE 4 MG: 4 TABLET ORAL at 13:13

## 2022-12-20 RX ADMIN — LEVOTHYROXINE SODIUM 100 MCG: 0.1 TABLET ORAL at 05:56

## 2022-12-20 RX ADMIN — CARVEDILOL 3.12 MG: 3.12 TABLET, FILM COATED ORAL at 07:33

## 2022-12-20 RX ADMIN — TIZANIDINE 4 MG: 4 TABLET ORAL at 05:56

## 2022-12-20 RX ADMIN — METHYLPREDNISOLONE SODIUM SUCCINATE 40 MG: 40 INJECTION, POWDER, FOR SOLUTION INTRAMUSCULAR; INTRAVENOUS at 22:46

## 2022-12-20 RX ADMIN — ATORVASTATIN CALCIUM 40 MG: 40 TABLET, FILM COATED ORAL at 20:24

## 2022-12-20 RX ADMIN — LOSARTAN POTASSIUM 50 MG: 50 TABLET, FILM COATED ORAL at 09:01

## 2022-12-20 RX ADMIN — HYDROMORPHONE HYDROCHLORIDE 1 MG: 1 INJECTION, SOLUTION INTRAMUSCULAR; INTRAVENOUS; SUBCUTANEOUS at 20:24

## 2022-12-20 RX ADMIN — TRAMADOL HYDROCHLORIDE 50 MG: 50 TABLET, COATED ORAL at 11:17

## 2022-12-20 NOTE — PROGRESS NOTES
[facility]    Patient Name: Ml Reyes  : 1950  MRN: [id number]  Primary Care Physician: Evita Keith APRN  Date of admission: 2022     Patient Care Team:  Evita Keith APRN as PCP - General (Nurse Practitioner)          Subjective   History Present Illness     Chief Complaint:   Chief Complaint   Patient presents with   • Sciatica     Pt reports increased pain in L lower back and L hip that radiates into LLE.  Pt denies any loss of bowel/bladder or saddle anesthesia.      Sciatica     Ms. Reyes is a 72 y.o.  presents to [facility] complaining of sciatica       History of Present Illness    ED 22: 72-year-old female returns after being seen yesterday in the emergency department.  She states that she has had severe low back pain radiating down the posterior lateral aspect of her left leg.  She was told she had sciatica on a previous visit.  The patient been taking statins and again without improvement.  She states that she had received tramadol in the ER previously but the pain never completely went away.  Patient is noted to be a diabetic and also has had issues in the past with renal insufficiency and is concerned about potential NSAID use.  The patient reports no advanced imaging of her back.  She reports no fever or chills.  Reports no direct impact trauma.  She reports no areas of anesthesia she reports no change in bowel or bladder habit     OBS 22: Patient is a 72-year-old female presenting to the hospital with low back pain that radiates to her left hip and down to her left leg above her kneecap.  Patient reports she has had previous sciatic pain in the past but none that is caused her not to be able to walk.  Patient reports being a diabetic but does not have any neuropathy issues.  Patient reports no previous imaging in the past.  Patient denies numbness or paresthesis.  Denies loss of bowel or bladder function.  Patient reports 5/10 when  laying in bed and 10/10 when standing. Some relief with pain medication.  Patient denies use of blood thinners, history of blood clot or recent falls or injury.    OBS 12/20/22: Patient is a 72-year-old female reporting continued low back pain that radiates to left hip and down her left leg above her knee.  Patient working with physical therapy and having difficulty sitting in chair without increased pain.      Review of Systems   HENT: Negative.    Eyes: Negative.    Cardiovascular: Negative.    Respiratory: Negative.    Endocrine: Negative.    Hematologic/Lymphatic: Negative.    Skin: Negative.    Musculoskeletal: Positive for back pain and stiffness.   Gastrointestinal: Negative.    Genitourinary: Negative.    Neurological: Positive for weakness.   Psychiatric/Behavioral: Negative.    Allergic/Immunologic: Negative.            Personal History     Past Medical History: History reviewed. No pertinent past medical history.    Surgical History:    History reviewed. No pertinent surgical history.        Family History: family history is not on file. Otherwise pertinent FHx was reviewed and unremarkable.     Social History:  reports that she has never smoked. She has never used smokeless tobacco.      Medications:  Prior to Admission medications    Medication Sig Start Date End Date Taking? Authorizing Provider   acetaminophen (TYLENOL) 325 MG tablet Take 650 mg by mouth Every 6 (Six) Hours As Needed for Mild Pain.   Yes Alli Hong MD   amLODIPine (NORVASC) 10 MG tablet Take 10 mg by mouth Every Night.   Yes Alli Hong MD   atorvastatin (LIPITOR) 20 MG tablet Take 40 mg by mouth Every Night.   Yes Alli Hong MD   Calcium-Ergocalciferol 250-2.5 MG-MCG tablet Take 250 mg by mouth 1 (One) Time Per Week. On Monday at 8pm   Yes Alli Hong MD   carvedilol (COREG) 3.125 MG tablet Take 3.125 mg by mouth 2 (Two) Times a Day With Meals.   Yes Alli Hong MD   hydrALAZINE  (APRESOLINE) 10 MG tablet Take 10 mg by mouth Every Night. States she takes 20 mg in the am and 10mg at hs   Yes Alli Hong MD   insulin detemir (LEVEMIR) 100 UNIT/ML injection Inject 100 Units under the skin into the appropriate area as directed Every Night.   Yes Alli Hong MD   Insulin Lispro (humaLOG) 100 UNIT/ML injection Inject 30 Units under the skin into the appropriate area as directed 3 (Three) Times a Day Before Meals.   Yes Alli Hong MD   levothyroxine (SYNTHROID, LEVOTHROID) 100 MCG tablet Take 100 mcg by mouth Daily.   Yes Alli Hong MD   lidocaine (LIDODERM) 5 % Place 1 patch on the skin as directed by provider Daily. Remove & Discard patch within 12 hours or as directed by MD 12/18/22  Yes Shirley Graham APRN   olmesartan (BENICAR) 20 MG tablet Take 40 mg by mouth Daily.   Yes Alli Hong MD   TiZANidine (ZANAFLEX) 2 MG capsule Take 1 capsule by mouth 3 (Three) Times a Day. 12/18/22  Yes Shirley Graham APRN   hydrALAZINE (APRESOLINE) 10 MG tablet Take 20 mg by mouth Every Morning. Pt states she takes 20mg in the morning at 10mg at hs    Alli Hong MD       Allergies:  No Known Allergies    Objective   Objective     Vital Signs  Temp:  [97.4 °F (36.3 °C)-97.8 °F (36.6 °C)] 97.8 °F (36.6 °C)  Heart Rate:  [64-91] 74  Resp:  [16-18] 16  BP: ()/(46-73) 108/46  SpO2:  [98 %-99 %] 98 %  on   ;   Device (Oxygen Therapy): room air  Body mass index is 31.64 kg/m².    Physical Exam  Vitals and nursing note reviewed.   Constitutional:       Appearance: Normal appearance.   HENT:      Head: Normocephalic and atraumatic.      Right Ear: External ear normal.      Left Ear: External ear normal.      Nose: Nose normal.      Mouth/Throat:      Mouth: Mucous membranes are moist.      Pharynx: Oropharynx is clear.   Eyes:      Extraocular Movements: Extraocular movements intact.      Conjunctiva/sclera: Conjunctivae normal.       Pupils: Pupils are equal, round, and reactive to light.   Cardiovascular:      Rate and Rhythm: Normal rate and regular rhythm.      Pulses: Normal pulses.      Heart sounds: Normal heart sounds.   Pulmonary:      Effort: Pulmonary effort is normal.      Breath sounds: Normal breath sounds.   Abdominal:      General: Bowel sounds are normal.      Palpations: Abdomen is soft.   Musculoskeletal:         General: Tenderness present.      Cervical back: Normal range of motion.   Skin:     General: Skin is warm.      Capillary Refill: Capillary refill takes less than 2 seconds.   Neurological:      General: No focal deficit present.      Mental Status: She is alert and oriented to person, place, and time.      Motor: Weakness present.      Gait: Gait abnormal.   Psychiatric:         Mood and Affect: Mood normal.         Behavior: Behavior normal.         Thought Content: Thought content normal.         Judgment: Judgment normal.           Results Review:  I have personally reviewed most recent cardiac tracings, lab results, microbiology results and radiology images and interpretations and agree with findings, most notably: CBC, CMP, CK, MRI lumbar spine, urinalysis, urine culture      Results from last 7 days   Lab Units 12/19/22  0102   WBC 10*3/mm3 8.60   HEMOGLOBIN g/dL 10.5*   HEMATOCRIT % 34.4   PLATELETS 10*3/mm3 269     Results from last 7 days   Lab Units 12/20/22  0643 12/19/22  0102   SODIUM mmol/L 136 138   POTASSIUM mmol/L 4.6 3.8   CHLORIDE mmol/L 104 105   CO2 mmol/L 21.0* 22.0   BUN mg/dL 39* 23   CREATININE mg/dL 1.52* 1.17*   GLUCOSE mg/dL 318* 190*   CALCIUM mg/dL 8.9 9.2   ALT (SGPT) U/L  --  10   AST (SGOT) U/L  --  12     Estimated Creatinine Clearance: 42.1 mL/min (A) (by C-G formula based on SCr of 1.52 mg/dL (H)).  Brief Urine Lab Results  (Last result in the past 365 days)      Color   Clarity   Blood   Leuk Est   Nitrite   Protein   CREAT   Urine HCG        12/19/22 0203 Yellow   Clear    Trace   Trace   Positive   30 mg/dL (1+)                 Microbiology Results (last 10 days)     Procedure Component Value - Date/Time    Urine Culture - Urine, Urine, Clean Catch [[id number]]  (Abnormal) Collected: 12/19/22 0203    Lab Status: Preliminary result Specimen: Urine, Clean Catch Updated: 12/20/22 1006     Urine Culture >100,000 CFU/mL Gram Negative Bacilli    Narrative:      Colonization of the urinary tract without infection is common. Treatment is discouraged unless the patient is symptomatic, pregnant, or undergoing an invasive urologic procedure.          ECG/EMG Results (most recent)     None                  CT Lumbar Spine Without Contrast    Result Date: 12/19/2022  1.  Multilevel lumbar spondylosis as described, most prominently at L3-4 through L5-S1. Of note, neural foraminal stenosis is moderate to severe bilaterally at L4-5, and severe on the left and L5-S1. Spinal canal stenosis is moderate to severe at L3-4. MRI could more accurately assess degrees of spinal canal and neural foraminal stenosis. 2.  Chronic appearing fragmented facet osteophytes on the left at L5-S1. MRI as above could further assess acuity if clinically indicated. 3.  Right-sided sacroiliitis. Mild degenerative changes of the left sacroiliac joint. Electronically signed by:  Trever Salas M.D.  12/18/2022 11:19 PM Mountain Time    MRI Lumbar Spine Without Contrast    Result Date: 12/19/2022  1. Grade 1 anterolisthesis of L4 on L5 measuring 6 mm related to facet arthritis. 2. Multilevel lumbar degenerative findings above most pronounced at L4-5 and L5-S1. 3. Negative for acute osseous abnormality.  Electronically Signed By-Arpan Crespo MD On:12/19/2022 11:23 AM This report was finalized on [id number] by  Arpan Crespo MD.    XR Spine Lumbar Complete 4+VW    Result Date: 12/18/2022  1. No acute osseous abnormality. 2. Degenerative grade 1 anterolisthesis of L4 on L5 with mild degenerative changes at this level. Slot 63  Electronically signed by:  Mark Smith M.D.  12/18/2022 1:31 AM Mountain Time    XR Hip With or Without Pelvis 2 - 3 View Left    Result Date: 12/18/2022  Normal left hip. Electronically signed by:  Josué Simpson M.D.  12/18/2022 1:31 AM Mountain Time        Estimated Creatinine Clearance: 42.1 mL/min (A) (by C-G formula based on SCr of 1.52 mg/dL (H)).    Assessment & Plan   Assessment/Plan       Active Hospital Problems    Diagnosis  POA   • **Degeneration of lumbar intervertebral disc with acute herniation [M51.36, M51.26]  Yes      Resolved Hospital Problems   No resolved problems to display.       Degeneration of lumbar intervertebral disc with acute herniation   -Physical therapy consult  -Case management consult  -CK 230  -X-ray lumbar spine shows no acute osseous abnormality, degenerative grade 1 anterolisthesis of L4 on L5 with mild degenerative changes  -CT lumbar spine shows multilevel lumbar spondylosis most probably L3-4 through L5-S1, neural foraminal stenosis at moderate to severe bilateral L4-5 and severe in the left and L5-S1; chronic fragmented fossa osteophytes in the left L5-S1; mild degenerative change of the left sacroiliac joint  -MRI lumbar spine shows grade 1 anterolisthesis of L4 on L5 measuring 6 mm related to facet arthritis, multilevel lumbar degenerative findings most pronounced at L4-5 and L5-S1 negative for acute abnormalities  -IV steroids  -IV/oral analgesics as needed  -Continue zanaflex and lidocaine   -Back brace application     Acute urinary tract infection without hematuria  -Urinalysis shows ketones, trace blood, positive nitrates, leukocytes, protein, 0-2 RBC, 13-20 WBC, 4+ bacteria  -Urine culture pending  -IV fluids  -IV Rocephin empirically     Diabetes mellitus type 2  -Hold p.o. diabetic medication  -Monitor blood glucose before meals and at bedtime  -Initiate sliding scale insulin  -Continue Lantus     Chronic essential hypertension  - Continue amlodipine,  atorvastatin, carvedilol, hydralazine, and olmesartan  - Continuous cardiac monitoring     Hypothyroidism  -Continue levothyroxine      VTE Prophylaxis -   Mechanical Order History:      Ordered        12/19/22 0736  Place Sequential Compression Device  Once            12/19/22 0736  Maintain Sequential Compression Device  Continuous            12/19/22 0236  Place Sequential Compression Device  Once            12/19/22 0236  Maintain Sequential Compression Device  Continuous                    Pharmalogical Order History:     None          CODE STATUS:    Code Status and Medical Interventions:   Ordered at: 12/19/22 0736     Level Of Support Discussed With:    Patient     Code Status (Patient has no pulse and is not breathing):    CPR (Attempt to Resuscitate)     Medical Interventions (Patient has pulse or is breathing):    Full Support       This patient has been examined wearing personal protective equipment.     I discussed the patient's findings and my recommendations with patient, family, nursing staff, primary care team and consulting provider.      Signature: Electronically signed by SPENCER Cruz, 12/20/22, 3:42 PM EST.

## 2022-12-20 NOTE — THERAPY TREATMENT NOTE
Subjective: Pt agreeable to therapeutic plan of care.  Pt reports she is still in a lot of pain.  Just had a muscle relaxer and is feeling a little groggy.  She is very motivated to try to move to be able to go home.  She reports she is still getting a lot of pain in her LLE and in the back of her leg.       Objective:     Bed mobility - Max-A and Assist x 2.  At times mod A x2; however, pt tends to push back against assistance.  Pain limits her ability to come to EOB and roll.  Rolling R easier than rolling L.  At EOB pt kept scooting out further to be able to straighten LLE, cues needed to prevent further scooting out.      While in sitting and pt leaning to R (too much pain to sit centered or towards L) Long axis pull on leg performed and gentle STM to hamstring region performed to help reduce pain.      Max A x2 performed for sit to supine and pulling pt up in bed.  Once in R sideying LLE long axis pull with gait belt performed and provided pt with relief of LE pain.      Transfers - N/A or Not attempted.  Ambulation - 0 feet N/A or Not attempted.    Vitals: Hypotensive    Pain: 10 VAS   Location: L sided low back into LLE  Intervention for pain: Repositioned, RN notified and Therapeutic Presence, STM    Education: Provided education on the importance of mobility in the acute care setting and Verbal/Tactile Cues.  Education on nerve referral pain.     Assessment: Ml Reyes presents with functional mobility impairments which indicate the need for skilled intervention. Pt still in a lot of pain limiting her ability to come to sitting.  Max A x2 required, and max verbal cues.  Pt pushes back against therapist assist as she is fearful of sitting up and due to pain.  Cues provided and education provided to reduce pt's worry.  Discussed with nurse pt's pain and limited physical ability. LLE long axis pull helps reduce her pain -  instructed safely. Still recommend home as this time as pt's pain will need  "to be managed first prior to d/c then OPPT will be best option. Will continue to follow and progress as tolerated.     Plan/Recommendations:   Low Intensity Therapy recommended post-acute care - This is recommended as therapy feels this patient would require 2-3 visits per week. OP or HH would be the best option depending on patient's home bound status. Consider, if the patient has other  \"skilled\" needs such as wounds, IV antibiotics, etc. Combined with \"low intensity\" could also equate to a SNF. If patient is medically complex, consider LTAC.. Pt requires rolling walker at discharge.     Pt desires Home with family assist and Outpatient therapy at discharge. Pt cooperative; agreeable to therapeutic recommendations and plan of care.         Basic Mobility 6-click:  Rollin = Total, A lot = 2, A little = 3; 4 = None  Supine>Sit:   1 = Total, A lot = 2, A little = 3; 4 = None   Sit>Stand with arms:  1 = Total, A lot = 2, A little = 3; 4 = None  Bed>Chair:   1 = Total, A lot = 2, A little = 3; 4 = None  Ambulate in room:  1 = Total, A lot = 2, A little = 3; 4 = None  3-5 Steps with railin = Total, A lot = 2, A little = 3; 4 = None  Score: 8    Modified Stephanie: N/A = No pre-op stroke/TIA    Post-Tx Position: Supine with HOB Elevated, Alarms activated and Call light and personal items within reach  PPE: gloves and surgical mask    "

## 2022-12-20 NOTE — PLAN OF CARE
"Goal Outcome Evaluation:  Plan of Care Reviewed With: patient, family           Assessment: Ml Reyes presents with functional mobility impairments which indicate the need for skilled intervention. Pt still in a lot of pain limiting her ability to come to sitting.  Max A x2 required, and max verbal cues.  Pt pushes back against therapist assist as she is fearful of sitting up and due to pain.  Cues provided and education provided to reduce pt's worry.  Discussed with nurse pt's pain and limited physical ability. LLE long axis pull helps reduce her pain -  instructed safely. Still recommend home as this time as pt's pain will need to be managed first prior to d/c then OPPT will be best option. Will continue to follow and progress as tolerated.     Plan/Recommendations:   Low Intensity Therapy recommended post-acute care - This is recommended as therapy feels this patient would require 2-3 visits per week. OP or HH would be the best option depending on patient's home bound status. Consider, if the patient has other  \"skilled\" needs such as wounds, IV antibiotics, etc. Combined with \"low intensity\" could also equate to a SNF. If patient is medically complex, consider LTAC.. Pt requires rolling walker at discharge.  "

## 2022-12-20 NOTE — CASE MANAGEMENT/SOCIAL WORK
Continued Stay Note  [facility]     Patient Name: Ml Reyes  MRN: [id number]  Today's Date: 12/20/2022    Admit Date: 12/18/2022    Plan: Return home with spouse. Referral to [facility] for lumbar back brace.   Discharge Plan     Row Name 12/20/22 0970       Plan    Plan Return home with spouse. Referral to [facility] for lumbar back brace.    Plan Comments Received notification from pt nurse she is need of lumbar back brace. Liaison at [facility] notified, pending delivery from ortho rep. NP notified of need of order. Barriers to d/c: IV abx, IVF.                    Expected Discharge Date and Time     Expected Discharge Date Expected Discharge Time    Dec 20, 2022             Vicky Calabrese RN

## 2022-12-20 NOTE — PLAN OF CARE
Goal Outcome Evaluation:  Plan of Care Reviewed With: patient, family        Progress: improving  Outcome Evaluation: Patient resting in bed, has recieved back brace and pain has been treated per MAR, pt has no complaint at this time   Pt called to get an appt with Dr. Megan English and she was told by the office that he is not in network but she did call the insurance and they told her he is in network. She said she is in so much pain that she is throwing up.   She can't use her right hand at

## 2022-12-21 VITALS
WEIGHT: 214.29 LBS | TEMPERATURE: 97.7 F | OXYGEN SATURATION: 100 % | DIASTOLIC BLOOD PRESSURE: 50 MMHG | HEART RATE: 70 BPM | BODY MASS INDEX: 31.74 KG/M2 | RESPIRATION RATE: 16 BRPM | SYSTOLIC BLOOD PRESSURE: 111 MMHG | HEIGHT: 69 IN

## 2022-12-21 LAB
ANION GAP SERPL CALCULATED.3IONS-SCNC: 12 MMOL/L (ref 5–15)
BACTERIA SPEC AEROBE CULT: ABNORMAL
BASOPHILS # BLD AUTO: 0 10*3/MM3 (ref 0–0.2)
BASOPHILS NFR BLD AUTO: 0.3 % (ref 0–1.5)
BUN SERPL-MCNC: 46 MG/DL (ref 8–23)
BUN/CREAT SERPL: 31.3 (ref 7–25)
CALCIUM SPEC-SCNC: 8.4 MG/DL (ref 8.6–10.5)
CHLORIDE SERPL-SCNC: 108 MMOL/L (ref 98–107)
CO2 SERPL-SCNC: 16 MMOL/L (ref 22–29)
CREAT SERPL-MCNC: 1.47 MG/DL (ref 0.57–1)
DEPRECATED RDW RBC AUTO: 49.4 FL (ref 37–54)
EGFRCR SERPLBLD CKD-EPI 2021: 38 ML/MIN/1.73
EOSINOPHIL # BLD AUTO: 0 10*3/MM3 (ref 0–0.4)
EOSINOPHIL NFR BLD AUTO: 0 % (ref 0.3–6.2)
ERYTHROCYTE [DISTWIDTH] IN BLOOD BY AUTOMATED COUNT: 14.8 % (ref 12.3–15.4)
GLUCOSE BLDC GLUCOMTR-MCNC: 114 MG/DL (ref 70–105)
GLUCOSE BLDC GLUCOMTR-MCNC: 61 MG/DL (ref 70–105)
GLUCOSE BLDC GLUCOMTR-MCNC: 77 MG/DL (ref 70–105)
GLUCOSE SERPL-MCNC: 121 MG/DL (ref 65–99)
HCT VFR BLD AUTO: 32.6 % (ref 34–46.6)
HGB BLD-MCNC: 9.7 G/DL (ref 12–15.9)
LYMPHOCYTES # BLD AUTO: 0.7 10*3/MM3 (ref 0.7–3.1)
LYMPHOCYTES NFR BLD AUTO: 4.9 % (ref 19.6–45.3)
MCH RBC QN AUTO: 26.2 PG (ref 26.6–33)
MCHC RBC AUTO-ENTMCNC: 29.8 G/DL (ref 31.5–35.7)
MCV RBC AUTO: 88.1 FL (ref 79–97)
MONOCYTES # BLD AUTO: 0.2 10*3/MM3 (ref 0.1–0.9)
MONOCYTES NFR BLD AUTO: 1.7 % (ref 5–12)
NEUTROPHILS NFR BLD AUTO: 12.4 10*3/MM3 (ref 1.7–7)
NEUTROPHILS NFR BLD AUTO: 93.1 % (ref 42.7–76)
NRBC BLD AUTO-RTO: 0.1 /100 WBC (ref 0–0.2)
PLATELET # BLD AUTO: 218 10*3/MM3 (ref 140–450)
PMV BLD AUTO: 9.4 FL (ref 6–12)
POTASSIUM SERPL-SCNC: 4.3 MMOL/L (ref 3.5–5.2)
PROCALCITONIN SERPL-MCNC: 0.04 NG/ML (ref 0–0.25)
RBC # BLD AUTO: 3.7 10*6/MM3 (ref 3.77–5.28)
SODIUM SERPL-SCNC: 136 MMOL/L (ref 136–145)
WBC NRBC COR # BLD: 13.3 10*3/MM3 (ref 3.4–10.8)

## 2022-12-21 PROCEDURE — 97116 GAIT TRAINING THERAPY: CPT

## 2022-12-21 PROCEDURE — G0378 HOSPITAL OBSERVATION PER HR: HCPCS

## 2022-12-21 PROCEDURE — 63710000001 INSULIN LISPRO (HUMAN) PER 5 UNITS: Performed by: NURSE PRACTITIONER

## 2022-12-21 PROCEDURE — 25010000002 HYDROMORPHONE 1 MG/ML SOLUTION: Performed by: NURSE PRACTITIONER

## 2022-12-21 PROCEDURE — 96366 THER/PROPH/DIAG IV INF ADDON: CPT

## 2022-12-21 PROCEDURE — 63710000001 PREDNISONE PER 1 MG: Performed by: PHYSICIAN ASSISTANT

## 2022-12-21 PROCEDURE — 84145 PROCALCITONIN (PCT): CPT | Performed by: PHYSICIAN ASSISTANT

## 2022-12-21 PROCEDURE — 85025 COMPLETE CBC W/AUTO DIFF WBC: CPT | Performed by: NURSE PRACTITIONER

## 2022-12-21 PROCEDURE — 96376 TX/PRO/DX INJ SAME DRUG ADON: CPT

## 2022-12-21 PROCEDURE — 97530 THERAPEUTIC ACTIVITIES: CPT

## 2022-12-21 PROCEDURE — 82962 GLUCOSE BLOOD TEST: CPT

## 2022-12-21 PROCEDURE — 25010000002 CEFTRIAXONE PER 250 MG: Performed by: EMERGENCY MEDICINE

## 2022-12-21 PROCEDURE — 96361 HYDRATE IV INFUSION ADD-ON: CPT

## 2022-12-21 PROCEDURE — 97110 THERAPEUTIC EXERCISES: CPT

## 2022-12-21 PROCEDURE — 80048 BASIC METABOLIC PNL TOTAL CA: CPT | Performed by: NURSE PRACTITIONER

## 2022-12-21 RX ORDER — PREDNISONE 10 MG/1
10 TABLET ORAL DAILY
Status: DISCONTINUED | OUTPATIENT
Start: 2022-12-25 | End: 2022-12-21 | Stop reason: HOSPADM

## 2022-12-21 RX ORDER — PREDNISONE 20 MG/1
20 TABLET ORAL DAILY
Status: DISCONTINUED | OUTPATIENT
Start: 2022-12-23 | End: 2022-12-21 | Stop reason: HOSPADM

## 2022-12-21 RX ORDER — PREDNISONE 10 MG/1
TABLET ORAL
Qty: 12 TABLET | Refills: 0 | Status: SHIPPED | OUTPATIENT
Start: 2022-12-21

## 2022-12-21 RX ORDER — HYDROCODONE BITARTRATE AND ACETAMINOPHEN 7.5; 325 MG/1; MG/1
1 TABLET ORAL EVERY 6 HOURS PRN
Qty: 12 TABLET | Refills: 0 | OUTPATIENT
Start: 2022-12-21 | End: 2022-12-24

## 2022-12-21 RX ORDER — CEFDINIR 300 MG/1
300 CAPSULE ORAL 2 TIMES DAILY
Qty: 10 CAPSULE | Refills: 0 | Status: SHIPPED | OUTPATIENT
Start: 2022-12-21 | End: 2022-12-26

## 2022-12-21 RX ORDER — HYDROCODONE BITARTRATE AND ACETAMINOPHEN 7.5; 325 MG/1; MG/1
1 TABLET ORAL ONCE AS NEEDED
Status: COMPLETED | OUTPATIENT
Start: 2022-12-21 | End: 2022-12-21

## 2022-12-21 RX ADMIN — LOSARTAN POTASSIUM 50 MG: 50 TABLET, FILM COATED ORAL at 08:40

## 2022-12-21 RX ADMIN — PREDNISONE 30 MG: 20 TABLET ORAL at 12:42

## 2022-12-21 RX ADMIN — CARVEDILOL 3.12 MG: 3.12 TABLET, FILM COATED ORAL at 08:40

## 2022-12-21 RX ADMIN — TIZANIDINE 4 MG: 4 TABLET ORAL at 12:42

## 2022-12-21 RX ADMIN — INSULIN LISPRO 30 UNITS: 100 INJECTION, SOLUTION INTRAVENOUS; SUBCUTANEOUS at 08:40

## 2022-12-21 RX ADMIN — HYDROCODONE BITARTRATE AND ACETAMINOPHEN 1 TABLET: 7.5; 325 TABLET ORAL at 12:43

## 2022-12-21 RX ADMIN — LIDOCAINE 1 PATCH: 50 PATCH CUTANEOUS at 08:41

## 2022-12-21 RX ADMIN — HYDROMORPHONE HYDROCHLORIDE 1 MG: 1 INJECTION, SOLUTION INTRAMUSCULAR; INTRAVENOUS; SUBCUTANEOUS at 08:09

## 2022-12-21 RX ADMIN — SODIUM CHLORIDE 100 ML/HR: 9 INJECTION, SOLUTION INTRAVENOUS at 02:16

## 2022-12-21 RX ADMIN — CEFTRIAXONE 1 G: 1 INJECTION, POWDER, FOR SOLUTION INTRAMUSCULAR; INTRAVENOUS at 02:16

## 2022-12-21 RX ADMIN — TIZANIDINE 4 MG: 4 TABLET ORAL at 05:28

## 2022-12-21 RX ADMIN — SODIUM CHLORIDE 1000 ML: 9 INJECTION, SOLUTION INTRAVENOUS at 08:39

## 2022-12-21 RX ADMIN — HYDRALAZINE HYDROCHLORIDE 20 MG: 10 TABLET, FILM COATED ORAL at 08:40

## 2022-12-21 RX ADMIN — LEVOTHYROXINE SODIUM 100 MCG: 0.1 TABLET ORAL at 05:28

## 2022-12-21 NOTE — PROGRESS NOTES
[facility] progress note    Patient Name: Ml Reyes  : 1950  MRN: [id number]  Primary Care Physician: Evita Keith APRN  Date of admission: 2022     Patient Care Team:  Evita Keith APRN as PCP - General (Nurse Practitioner)          Subjective   History Present Illness     Chief Complaint:   Chief Complaint   Patient presents with   • Sciatica     Pt reports increased pain in L lower back and L hip that radiates into LLE.  Pt denies any loss of bowel/bladder or saddle anesthesia.            History of Present Illness  Ms. Reyes is a 72 y.o.  presents to [facility] complaining of sciatica         History of Present Illness     ED 22: 72-year-old female returns after being seen yesterday in the emergency department.  She states that she has had severe low back pain radiating down the posterior lateral aspect of her left leg.  She was told she had sciatica on a previous visit.  The patient been taking statins and again without improvement.  She states that she had received tramadol in the ER previously but the pain never completely went away.  Patient is noted to be a diabetic and also has had issues in the past with renal insufficiency and is concerned about potential NSAID use.  The patient reports no advanced imaging of her back.  She reports no fever or chills.  Reports no direct impact trauma.  She reports no areas of anesthesia she reports no change in bowel or bladder habit     OBS 22: Patient is a 72-year-old female presenting to the hospital with low back pain that radiates to her left hip and down to her left leg above her kneecap.  Patient reports she has had previous sciatic pain in the past but none that is caused her not to be able to walk.  Patient reports being a diabetic but does not have any neuropathy issues.  Patient reports no previous imaging in the past.  Patient denies numbness or paresthesis.  Denies loss of bowel or bladder  function.  Patient reports 5/10 when laying in bed and 10/10 when standing. Some relief with pain medication.  Patient denies use of blood thinners, history of blood clot or recent falls or injury.     OBS 12/20/22: Patient is a 72-year-old female reporting continued low back pain that radiates to left hip and down her left leg above her knee.  Patient working with physical therapy and having difficulty sitting in chair without increased pain.    12/21/2022:  Patient continues to report significant pain with attempts at movement but no new or acute symptoms are noted.  Physical therapy recommending inpatient rehab at discharge which patient is agreeable to      ROS   HENT: Negative.    Eyes: Negative.    Cardiovascular: Negative.    Respiratory: Negative.    Endocrine: Negative.    Hematologic/Lymphatic: Negative.    Skin: Negative.    Musculoskeletal: Positive for back pain and stiffness.   Gastrointestinal: Negative.    Genitourinary: Negative.    Neurological: Positive for weakness.   Psychiatric/Behavioral: Negative.    Allergic/Immunologic: Negative.             Personal History     Past Medical History: History reviewed. No pertinent past medical history.    Surgical History:    History reviewed. No pertinent surgical history.        Family History: family history is not on file. Otherwise pertinent FHx was reviewed and unremarkable.     Social History:  reports that she has never smoked. She has never used smokeless tobacco.      Medications:  Prior to Admission medications    Medication Sig Start Date End Date Taking? Authorizing Provider   acetaminophen (TYLENOL) 325 MG tablet Take 650 mg by mouth Every 6 (Six) Hours As Needed for Mild Pain.   Yes Alli Hong MD   amLODIPine (NORVASC) 10 MG tablet Take 10 mg by mouth Every Night.   Yes Alli Hong MD   atorvastatin (LIPITOR) 20 MG tablet Take 40 mg by mouth Every Night.   Yes Alli Hong MD   Calcium-Ergocalciferol 250-2.5  MG-MCG tablet Take 250 mg by mouth 1 (One) Time Per Week. On Monday at 8pm   Yes Alli Hong MD   carvedilol (COREG) 3.125 MG tablet Take 3.125 mg by mouth 2 (Two) Times a Day With Meals.   Yes Alli Hong MD   hydrALAZINE (APRESOLINE) 10 MG tablet Take 10 mg by mouth Every Night. States she takes 20 mg in the am and 10mg at hs   Yes Alli Hong MD   insulin detemir (LEVEMIR) 100 UNIT/ML injection Inject 100 Units under the skin into the appropriate area as directed Every Night.   Yes Provider, Historical, MD   Insulin Lispro (humaLOG) 100 UNIT/ML injection Inject 30 Units under the skin into the appropriate area as directed 3 (Three) Times a Day Before Meals.   Yes Alli Hong MD   levothyroxine (SYNTHROID, LEVOTHROID) 100 MCG tablet Take 100 mcg by mouth Daily.   Yes Alli Hong MD   lidocaine (LIDODERM) 5 % Place 1 patch on the skin as directed by provider Daily. Remove & Discard patch within 12 hours or as directed by MD 12/18/22  Yes Shirley Graham APRN   olmesartan (BENICAR) 20 MG tablet Take 40 mg by mouth Daily.   Yes Alli Hong MD   TiZANidine (ZANAFLEX) 2 MG capsule Take 1 capsule by mouth 3 (Three) Times a Day. 12/18/22  Yes Shirley Graham APRN   hydrALAZINE (APRESOLINE) 10 MG tablet Take 20 mg by mouth Every Morning. Pt states she takes 20mg in the morning at 10mg at hs    Alli Hong MD       Allergies:  No Known Allergies    Objective   Objective     Vital Signs  Temp:  [97.6 °F (36.4 °C)-97.9 °F (36.6 °C)] 97.7 °F (36.5 °C)  Heart Rate:  [57-91] 57  Resp:  [16-18] 16  BP: ()/(41-98) 117/47  SpO2:  [98 %-100 %] 100 %  on   ;   Device (Oxygen Therapy): room air  Body mass index is 31.64 kg/m².    Physical Exam  Constitutional:       General: She is not in acute distress.     Appearance: Normal appearance. She is not ill-appearing, toxic-appearing or diaphoretic.   HENT:      Head: Normocephalic.      Right  Ear: External ear normal.      Left Ear: External ear normal.      Nose: Nose normal.      Mouth/Throat:      Mouth: Mucous membranes are moist.   Eyes:      Extraocular Movements: Extraocular movements intact.   Cardiovascular:      Rate and Rhythm: Normal rate and regular rhythm.      Pulses: Normal pulses.   Pulmonary:      Effort: Pulmonary effort is normal.      Breath sounds: Normal breath sounds.   Abdominal:      General: Bowel sounds are normal.      Palpations: Abdomen is soft.   Musculoskeletal:      Cervical back: Normal range of motion.      Right lower leg: No edema.      Left lower leg: No edema.   Skin:     General: Skin is warm and dry.      Capillary Refill: Capillary refill takes less than 2 seconds.   Neurological:      General: No focal deficit present.      Mental Status: She is alert and oriented to person, place, and time.      Motor: Weakness present.   Psychiatric:         Mood and Affect: Mood normal.         Behavior: Behavior normal.         Thought Content: Thought content normal.         Judgment: Judgment normal.           Results Review:  I have personally reviewed most recent cardiac tracings, lab results and radiology images and interpretations and agree with findings, most notably: CBC, CMP, CK, MRI of lumbar spine, UA and urine culture.    Results from last 7 days   Lab Units 12/21/22  0725   WBC 10*3/mm3 13.30*   HEMOGLOBIN g/dL 9.7*   HEMATOCRIT % 32.6*   PLATELETS 10*3/mm3 218     Results from last 7 days   Lab Units 12/21/22  0725 12/20/22  0643 12/19/22  0102   SODIUM mmol/L 136   < > 138   POTASSIUM mmol/L 4.3   < > 3.8   CHLORIDE mmol/L 108*   < > 105   CO2 mmol/L 16.0*   < > 22.0   BUN mg/dL 46*   < > 23   CREATININE mg/dL 1.47*   < > 1.17*   GLUCOSE mg/dL 121*   < > 190*   CALCIUM mg/dL 8.4*   < > 9.2   ALT (SGPT) U/L  --   --  10   AST (SGOT) U/L  --   --  12   PROCALCITONIN ng/mL 0.04  --   --     < > = values in this interval not displayed.     Estimated Creatinine  Clearance: 43.6 mL/min (A) (by C-G formula based on SCr of 1.47 mg/dL (H)).  Brief Urine Lab Results  (Last result in the past 365 days)      Color   Clarity   Blood   Leuk Est   Nitrite   Protein   CREAT   Urine HCG        12/19/22 0203 Yellow   Clear   Trace   Trace   Positive   30 mg/dL (1+)                 Microbiology Results (last 10 days)     Procedure Component Value - Date/Time    Urine Culture - Urine, Urine, Clean Catch [[id number]]  (Abnormal) Collected: 12/19/22 0203    Lab Status: Preliminary result Specimen: Urine, Clean Catch Updated: 12/20/22 1006     Urine Culture >100,000 CFU/mL Gram Negative Bacilli    Narrative:      Colonization of the urinary tract without infection is common. Treatment is discouraged unless the patient is symptomatic, pregnant, or undergoing an invasive urologic procedure.          ECG/EMG Results (most recent)     None                  CT Lumbar Spine Without Contrast    Result Date: 12/19/2022  1.  Multilevel lumbar spondylosis as described, most prominently at L3-4 through L5-S1. Of note, neural foraminal stenosis is moderate to severe bilaterally at L4-5, and severe on the left and L5-S1. Spinal canal stenosis is moderate to severe at L3-4. MRI could more accurately assess degrees of spinal canal and neural foraminal stenosis. 2.  Chronic appearing fragmented facet osteophytes on the left at L5-S1. MRI as above could further assess acuity if clinically indicated. 3.  Right-sided sacroiliitis. Mild degenerative changes of the left sacroiliac joint. Electronically signed by:  Trever Salas M.D.  12/18/2022 11:19 PM Mountain Time    MRI Lumbar Spine Without Contrast    Result Date: 12/19/2022  1. Grade 1 anterolisthesis of L4 on L5 measuring 6 mm related to facet arthritis. 2. Multilevel lumbar degenerative findings above most pronounced at L4-5 and L5-S1. 3. Negative for acute osseous abnormality.  Electronically Signed By-Arpan Crespo MD On:12/19/2022 11:23 AM This  report was finalized on [id number] by  Arpan Crespo MD.    XR Spine Lumbar Complete 4+VW    Result Date: 12/18/2022  1. No acute osseous abnormality. 2. Degenerative grade 1 anterolisthesis of L4 on L5 with mild degenerative changes at this level. Slot 63 Electronically signed by:  Mark Smith M.D.  12/18/2022 1:31 AM Mountain Time    XR Hip With or Without Pelvis 2 - 3 View Left    Result Date: 12/18/2022  Normal left hip. Electronically signed by:  Josué Simpson M.D.  12/18/2022 1:31 AM Mountain Time        Estimated Creatinine Clearance: 43.6 mL/min (A) (by C-G formula based on SCr of 1.47 mg/dL (H)).    Assessment & Plan   Assessment/Plan       Active Hospital Problems    Diagnosis  POA   • **Degeneration of lumbar intervertebral disc with acute herniation [M51.36, M51.26]  Yes      Resolved Hospital Problems   No resolved problems to display.     Degeneration of lumbar intervertebral disc with acute herniation   -Physical therapy consult  -Case management consult  -CK 230  -X-ray lumbar spine shows no acute osseous abnormality, degenerative grade 1 anterolisthesis of L4 on L5 with mild degenerative changes  -CT lumbar spine shows multilevel lumbar spondylosis most probably L3-4 through L5-S1, neural foraminal stenosis at moderate to severe bilateral L4-5 and severe in the left and L5-S1; chronic fragmented fossa osteophytes in the left L5-S1; mild degenerative change of the left sacroiliac joint  -MRI lumbar spine shows grade 1 anterolisthesis of L4 on L5 measuring 6 mm related to facet arthritis, multilevel lumbar degenerative findings most pronounced at L4-5 and L5-S1 negative for acute abnormalities  -IV steroids  -IV/oral analgesics as needed  -Continue zanaflex and lidocaine   -Back brace application  -PT recommending inpatient rehab  -Hospitalist consulted     Acute urinary tract infection without hematuria  -Urinalysis shows ketones, trace blood, positive nitrates, leukocytes, protein, 0-2  RBC, 13-20 WBC, 4+ bacteria  -Urine culture pending  -IV fluids  -IV Rocephin empirically    OCTAVIO  Lab Results   Component Value Date    CREATININE 1.47 (H) 12/21/2022    BUN 46 (H) 12/21/2022    BCR 31.3 (H) 12/21/2022   -Hold lisinopril  -Fluid bolus ordered  -Avoid nephrotoxic medication IV dye unless urgently needed  -Monitor BMP and I's and O's while admitted     Diabetes mellitus type 2  -Hold p.o. diabetic medication  -Monitor blood glucose before meals and at bedtime  -Initiate sliding scale insulin  -Continue Lantus     Chronic essential hypertension  - Continue amlodipine, atorvastatin, carvedilol, hydralazine, and olmesartan  - Continuous cardiac monitoring     Hypothyroidism  -Continue levothyroxine    Obesity (BMI: 31.64)  -Encouraged on lifestyle modifications            VTE Prophylaxis -   Mechanical Order History:      Ordered        12/19/22 0736  Place Sequential Compression Device  Once            12/19/22 0736  Maintain Sequential Compression Device  Continuous            12/19/22 0236  Place Sequential Compression Device  Once            12/19/22 0236  Maintain Sequential Compression Device  Continuous                    Pharmalogical Order History:     None          CODE STATUS:    Code Status and Medical Interventions:   Ordered at: 12/19/22 0736     Level Of Support Discussed With:    Patient     Code Status (Patient has no pulse and is not breathing):    CPR (Attempt to Resuscitate)     Medical Interventions (Patient has pulse or is breathing):    Full Support       This patient has been examined wearing personal protective equipment.     I discussed the patient's findings and my recommendations with patient and nursing staff.      Signature:Electronically signed by Josué Echols PA-C, 12/21/22, 10:17 AM EST.

## 2022-12-21 NOTE — DISCHARGE PLACEMENT REQUEST
"Ml Reyes (72 y.o. Female)     Date of Birth   1950    Social Security Number       Address   [address] [place] [address]  [facility]    Home Phone   [phone]    MRN   [id number]       [facility]    Marital Status                               Admission Date   12/18/22    Admission Type   Emergency    Admitting Provider   William Dominique MD    Attending Provider   Yosi Boston MD    Department, Room/Bed   [facility] OBSERVATION, 108/1       Discharge Date       Discharge Disposition       Discharge Destination                               Attending Provider: Yosi Boston MD    Allergies: No Known Allergies    Isolation: None   Infection: None   Code Status: CPR    Ht: 175.3 cm (69\")   Wt: 97.2 kg (214 lb 4.6 oz)    Admission Cmt: None   Principal Problem: Degeneration of lumbar intervertebral disc with acute herniation [M51.36,M51.26]                 Active Insurance as of 12/18/2022     Primary Coverage     Payor Plan Insurance Group Employer/Plan Group    MEDICARE MEDICARE A & B      Payor Plan Address Payor Plan Phone Number Payor Plan Fax Number Effective Dates    [address] [phone]  2/1/2016 - None Entered    [facility] [id number]       Subscriber Name Subscriber Birth Date Member ID       ML REYES 1950 [id number]           Secondary Coverage     Payor Plan Insurance Group Employer/Plan Group           Payor Plan Address Payor Plan Phone Number Payor Plan Fax Number Effective Dates    [address] [address] [phone]  7/24/2015 - None Entered    [facility] [id number]       Subscriber Name Subscriber Birth Date Member ID       ML REYES 1950 [id number]                 Emergency Contacts      (Rel.) Home Phone Work Phone Mobile Phone    BURGESSBURAK KAREN (Spouse) [phone] -- [phone]              "

## 2022-12-21 NOTE — CASE MANAGEMENT/SOCIAL WORK
Continued Stay Note   Patricio     Patient Name: Ml Reyes  MRN: [id number]  Today's Date: 12/21/2022    Admit Date: 12/18/2022    Plan: Referral to [facility] pending acceptance. No precert needed   Discharge Plan     Row Name 12/21/22 1112       Plan    Plan Referral to [facility] pending acceptance. No precert needed    Patient/Family in Agreement with Plan yes    Plan Comments Received epic chat from bedside nurse that PT was recommending IRF. Spoke with patient at bedside and gave her choices. She choce [facility]. Referral seent to [facility] and dcp sent. Family educated on need to transport patient if she does not meet ambulance criteria for transport. Verbalizes understanding.                   Expected Discharge Date and Time     Expected Discharge Date Expected Discharge Time    Dec 21, 2022             Edyta Salas RN

## 2022-12-21 NOTE — THERAPY TREATMENT NOTE
Subjective: Pt agreeable to therapeutic plan of care.    Objective:     Bed mobility - Mod-A, Max-A and Assist x 2  Transfers - Max-A and Assist x 2 with VC for forward lean to come to standing with RWx.   Ambulation - 0 feet N/A or Not attempted.    Vitals: WNL    Pain: 9 VAS   Location: Popliteal region in sitting  Intervention for pain: Repositioned, RN notified and Increased Activity    Education: Provided education on the importance of mobility in the acute care setting, Verbal/Tactile Cues and Transfer Training    Assessment: Ml Reyes presents with functional mobility impairments which indicate the need for skilled intervention. Tolerating session today without incident. Pt with improvement in mobiltiy this date. Pt continues to require Ax2 to come to sitting EOB. Pt hesitant to place weight through LLE in sitting due to pain. Pt positioned at an angle on the EOB and stood with Ax2. Pt requires significant increased time and cueing. Attempted sitting to both sides, tolerated better exiting bed to Left. Pt stood for approx 2 min, attempted to sit in chair, but pt could not tolerate, so returned to supine following treatment. Pt states improved pain in standing than sitting. Pt likely to need ambulance transport to IP rehab. Family unlikely able to transport pt into car. Will continue to follow and progress as tolerated.     Plan/Recommendations:   High Intensity Therapy recommended post-acute care. This is recommended as therapy feels the patient would require 5-6 days per week, 2-3 hours per day. At this time, inpatient rehabilitation (acute rehab) would be the first choice and SNF would be second.. Pt requires no DME at discharge.     Pt desires Inpatient Rehabilitation placement at discharge. Pt cooperative; agreeable to therapeutic recommendations and plan of care.         Basic Mobility 6-click:  Rollin = Total, A lot = 2, A little = 3; 4 = None  Supine>Sit:   1 = Total, A lot = 2, A  little = 3; 4 = None   Sit>Stand with arms:  1 = Total, A lot = 2, A little = 3; 4 = None  Bed>Chair:   1 = Total, A lot = 2, A little = 3; 4 = None  Ambulate in room:  1 = Total, A lot = 2, A little = 3; 4 = None  3-5 Steps with railin = Total, A lot = 2, A little = 3; 4 = None  Score: 11    Modified [place]: N/A = No pre-op stroke/TIA    Post-Tx Position: Supine with HOB Elevated, Alarms activated and Call light and personal items within reach  PPE: gloves and surgical mask

## 2022-12-21 NOTE — CASE MANAGEMENT/SOCIAL WORK
Continued Stay Note   Patricio     Patient Name: Ml Reyes  MRN: [id number]  Today's Date: 12/21/2022    Admit Date: 12/18/2022    Plan: Accepted at [facility] with bed ready after 130 pm on 12/22/2022. Spouse to transport   Discharge Plan     Row Name 12/21/22 1154       Plan    Plan Accepted at [facility] with bed ready after 130 pm on 12/22/2022. Spouse to transport    Plan Comments Bedside nurse notfied via epic chat    Row Name 12/21/22 1112       Plan    Plan Referral to [facility] pending acceptance. No precert needed    Patient/Family in Agreement with Plan yes    Plan Comments Received epic chat from bedside nurse that PT was recommending IRF. Spoke with patient at bedside and gave her choices. She choce [facility]. Referral seent to [facility] and [facility] sent. Family educated on need to transport patient if she does not meet ambulance criteria for transport. Verbalizes understanding.                Expected Discharge Date and Time     Expected Discharge Date Expected Discharge Time    Dec 21, 2022             Edyta Salas RN

## 2022-12-21 NOTE — PLAN OF CARE
Assessment: Ml Reyes presents with functional mobility impairments which indicate the need for skilled intervention. Tolerating session today without incident. Pt with improvement in mobiltiy this date. Pt continues to require Ax2 to come to sitting EOB. Pt hesitant to place weight through LLE in sitting due to pain. Pt positioned at an angle on the EOB and stood with Ax2. Pt requires significant increased time and cueing. Attempted sitting to both sides, tolerated better exiting bed to Left. Pt stood for approx 2 min, attempted to sit in chair, but pt could not tolerate, so returned to supine following treatment. Pt states improved pain in standing than sitting. Pt likely to need ambulance transport to IP rehab. Family unlikely able to transport pt into car. Will continue to follow and progress as tolerated.

## 2022-12-21 NOTE — DISCHARGE SUMMARY
[facility]    Evita Keith APRN    CHIEF COMPLAINT:     Back pain    HISTORY OF PRESENT ILLNESS:    History of Present Illness  Ms. Reyes is a 72 y.o.  presents to [facility] complaining of sciatica         History of Present Illness     ED 12/19/22: 72-year-old female returns after being seen yesterday in the emergency department.  She states that she has had severe low back pain radiating down the posterior lateral aspect of her left leg.  She was told she had sciatica on a previous visit.  The patient been taking statins and again without improvement.  She states that she had received tramadol in the ER previously but the pain never completely went away.  Patient is noted to be a diabetic and also has had issues in the past with renal insufficiency and is concerned about potential NSAID use.  The patient reports no advanced imaging of her back.  She reports no fever or chills.  Reports no direct impact trauma.  She reports no areas of anesthesia she reports no change in bowel or bladder habit     OBS 12/19/22: Patient is a 72-year-old female presenting to the hospital with low back pain that radiates to her left hip and down to her left leg above her kneecap.  Patient reports she has had previous sciatic pain in the past but none that is caused her not to be able to walk.  Patient reports being a diabetic but does not have any neuropathy issues.  Patient reports no previous imaging in the past.  Patient denies numbness or paresthesis.  Denies loss of bowel or bladder function.  Patient reports 5/10 when laying in bed and 10/10 when standing. Some relief with pain medication.  Patient denies use of blood thinners, history of blood clot or recent falls or injury.     OBS 12/20/22: Patient is a 72-year-old female reporting continued low back pain that radiates to left hip and down her left leg above her knee.  Patient working with physical therapy and having difficulty  sitting in chair without increased pain.     12/21/2022:  Patient continues to report significant pain with attempts at movement but no new or acute symptoms are noted.  Physical therapy recommending inpatient rehab at discharge which patient is agreeable to                History reviewed. No pertinent past medical history.  History reviewed. No pertinent surgical history.  History reviewed. No pertinent family history.  Social History     Tobacco Use   • Smoking status: Never   • Smokeless tobacco: Never     Medications Prior to Admission   Medication Sig Dispense Refill Last Dose   • acetaminophen (TYLENOL) 325 MG tablet Take 650 mg by mouth Every 6 (Six) Hours As Needed for Mild Pain.   12/18/2022   • amLODIPine (NORVASC) 10 MG tablet Take 10 mg by mouth Every Night.   12/18/2022   • atorvastatin (LIPITOR) 20 MG tablet Take 40 mg by mouth Every Night.   12/18/2022   • Calcium-Ergocalciferol 250-2.5 MG-MCG tablet Take 250 mg by mouth 1 (One) Time Per Week. On Monday at 8pm   Past Week at 2000   • carvedilol (COREG) 3.125 MG tablet Take 3.125 mg by mouth 2 (Two) Times a Day With Meals.   12/18/2022   • hydrALAZINE (APRESOLINE) 10 MG tablet Take 10 mg by mouth Every Night. States she takes 20 mg in the am and 10mg at hs   12/18/2022   • insulin detemir (LEVEMIR) 100 UNIT/ML injection Inject 100 Units under the skin into the appropriate area as directed Every Night.   12/17/2022   • Insulin Lispro (humaLOG) 100 UNIT/ML injection Inject 30 Units under the skin into the appropriate area as directed 3 (Three) Times a Day Before Meals.   12/17/2022   • levothyroxine (SYNTHROID, LEVOTHROID) 100 MCG tablet Take 100 mcg by mouth Daily.   12/18/2022   • lidocaine (LIDODERM) 5 % Place 1 patch on the skin as directed by provider Daily. Remove & Discard patch within 12 hours or as directed by MD 12 patch 0 12/18/2022   • olmesartan (BENICAR) 20 MG tablet Take 40 mg by mouth Daily.   12/18/2022   • TiZANidine (ZANAFLEX) 2 MG  capsule Take 1 capsule by mouth 3 (Three) Times a Day. 10 capsule 0 12/18/2022   • hydrALAZINE (APRESOLINE) 10 MG tablet Take 20 mg by mouth Every Morning. Pt states she takes 20mg in the morning at 10mg at hs        Allergies:  Patient has no known allergies.    Immunization History   Administered Date(s) Administered   • COVID-19 (PFIZER) BIVALENT BOOSTER 12+YRS 10/18/2022   • COVID-19 (PFIZER) PURPLE CAP 02/22/2021, 03/15/2021, 10/13/2021, 04/09/2022           REVIEW OF SYSTEMS:    ROS   HENT: Negative.    Eyes: Negative.    Cardiovascular: Negative.    Respiratory: Negative.    Endocrine: Negative.    Hematologic/Lymphatic: Negative.    Skin: Negative.    Musculoskeletal: Positive for back pain and stiffness.   Gastrointestinal: Negative.    Genitourinary: Negative.    Neurological: Positive for weakness.   Psychiatric/Behavioral: Negative.    Allergic/Immunologic: Negative.      Vital Signs  Temp:  [97.6 °F (36.4 °C)-97.9 °F (36.6 °C)] 97.7 °F (36.5 °C)  Heart Rate:  [57-88] 70  Resp:  [16] 16  BP: ()/(41-98) 111/50          Physical Exam:  Physical Exam  Vitals reviewed.   Constitutional:       General: She is not in acute distress.     Appearance: Normal appearance. She is not ill-appearing, toxic-appearing or diaphoretic.   HENT:      Head: Normocephalic.      Right Ear: External ear normal.      Left Ear: External ear normal.      Nose: Nose normal.      Mouth/Throat:      Mouth: Mucous membranes are moist.   Eyes:      Extraocular Movements: Extraocular movements intact.   Cardiovascular:      Rate and Rhythm: Normal rate and regular rhythm.      Pulses: Normal pulses.   Pulmonary:      Effort: Pulmonary effort is normal.      Breath sounds: Normal breath sounds.   Abdominal:      General: Bowel sounds are normal.      Palpations: Abdomen is soft.   Musculoskeletal:      Cervical back: Normal range of motion.      Right lower leg: No edema.      Left lower leg: No edema.   Skin:     General: Skin is  "warm and dry.      Capillary Refill: Capillary refill takes less than 2 seconds.   Neurological:      General: No focal deficit present.      Mental Status: She is alert.   Psychiatric:         Mood and Affect: Mood normal.         Behavior: Behavior normal.         Thought Content: Thought content normal.         Judgment: Judgment normal.         Emotional Behavior:   Normal   Debilities:  Low back pain  Results Review:    I reviewed the patient's new clinical results.  Lab Results (most recent)     Procedure Component Value Units Date/Time    POC Glucose Once [[id number]]  (Normal) Collected: 12/21/22 1158    Specimen: Blood Updated: 12/21/22 1159     Glucose 77 mg/dL      Comment: Serial Number: [id number]:  [id number]       POC Glucose Once [[id number]]  (Abnormal) Collected: 12/21/22 1130    Specimen: Blood Updated: 12/21/22 1132     Glucose 61 mg/dL      Comment: Serial Number: [id number]:  [id number]       Procalcitonin [[id number]]  (Normal) Collected: 12/21/22 0725    Specimen: Blood from Hand, Left Updated: 12/21/22 0910     Procalcitonin 0.04 ng/mL     Narrative:      As a Marker for Sepsis (Non-Neonates):    1. <0.5 ng/mL represents a low risk of severe sepsis and/or septic shock.  2. >2 ng/mL represents a high risk of severe sepsis and/or septic shock.    As a Marker for Lower Respiratory Tract Infections that require antibiotic therapy:    PCT on Admission    Antibiotic Therapy       6-12 Hrs later    >0.5                Strongly Recommended  >0.25 - <0.5        Recommended   0.1 - 0.25          Discouraged              Remeasure/reassess PCT  <0.1                Strongly Discouraged     Remeasure/reassess PCT    As 28 day mortality risk marker: \"Change in Procalcitonin Result\" (>80% or <=80%) if Day 0 (or Day 1) and Day 4 values are available. Refer to http://www.[employer]-pct-calculator.com    Change in PCT <=80%  A decrease of PCT levels below or equal to 80% defines a positive change in PCT " test result representing a higher risk for 28-day all-cause mortality of patients diagnosed with severe sepsis for septic shock.    Change in PCT >80%  A decrease of PCT levels of more than 80% defines a negative change in PCT result representing a lower risk for 28-day all-cause mortality of patients diagnosed with severe sepsis or septic shock.       Basic Metabolic Panel [[id number]]  (Abnormal) Collected: 12/21/22 0725    Specimen: Blood from Hand, Left Updated: 12/21/22 0808     Glucose 121 mg/dL      BUN 46 mg/dL      Creatinine 1.47 mg/dL      Sodium 136 mmol/L      Potassium 4.3 mmol/L      Comment: Slight hemolysis detected by analyzer. Results may be affected.        Chloride 108 mmol/L      CO2 16.0 mmol/L      Calcium 8.4 mg/dL      BUN/Creatinine Ratio 31.3     Anion Gap 12.0 mmol/L      eGFR 38.0 mL/min/1.73      Comment: National Kidney Foundation and American Society of Nephrology (ASN) Task Force recommended calculation based on the Chronic Kidney Disease Epidemiology Collaboration (CKD-EPI) equation refit without adjustment for race.       Narrative:      GFR Normal >60  Chronic Kidney Disease <60  Kidney Failure <15    The GFR formula is only valid for adults with stable renal function between ages 18 and 70.    CBC & Differential [[id number]]  (Abnormal) Collected: 12/21/22 0725    Specimen: Blood from Hand, Left Updated: 12/21/22 0742    Narrative:      The following orders were created for panel order CBC & Differential.  Procedure                               Abnormality         Status                     ---------                               -----------         ------                     CBC Auto Differential[[id number]]        Abnormal            Final result                 Please view results for these tests on the individual orders.    CBC Auto Differential [[id number]]  (Abnormal) Collected: 12/21/22 0725    Specimen: Blood from Hand, Left Updated: 12/21/22 0742     WBC 13.30 10*3/mm3       RBC 3.70 10*6/mm3      Hemoglobin 9.7 g/dL      Hematocrit 32.6 %      MCV 88.1 fL      MCH 26.2 pg      MCHC 29.8 g/dL      RDW 14.8 %      RDW-SD 49.4 fl      MPV 9.4 fL      Platelets 218 10*3/mm3      Neutrophil % 93.1 %      Lymphocyte % 4.9 %      Monocyte % 1.7 %      Eosinophil % 0.0 %      Basophil % 0.3 %      Neutrophils, Absolute 12.40 10*3/mm3      Lymphocytes, Absolute 0.70 10*3/mm3      Monocytes, Absolute 0.20 10*3/mm3      Eosinophils, Absolute 0.00 10*3/mm3      Basophils, Absolute 0.00 10*3/mm3      nRBC 0.1 /100 WBC     Urine Culture - Urine, Urine, Clean Catch [[id number]]  (Abnormal) Collected: 12/19/22 0203    Specimen: Urine, Clean Catch Updated: 12/20/22 1006     Urine Culture >100,000 CFU/mL Gram Negative Bacilli    Narrative:      Colonization of the urinary tract without infection is common. Treatment is discouraged unless the patient is symptomatic, pregnant, or undergoing an invasive urologic procedure.    Basic Metabolic Panel [[id number]]  (Abnormal) Collected: 12/20/22 0643    Specimen: Blood Updated: 12/20/22 0706     Glucose 318 mg/dL      BUN 39 mg/dL      Creatinine 1.52 mg/dL      Sodium 136 mmol/L      Potassium 4.6 mmol/L      Chloride 104 mmol/L      CO2 21.0 mmol/L      Calcium 8.9 mg/dL      BUN/Creatinine Ratio 25.7     Anion Gap 11.0 mmol/L      eGFR 36.5 mL/min/1.73      Comment: National Kidney Foundation and American Society of Nephrology (ASN) Task Force recommended calculation based on the Chronic Kidney Disease Epidemiology Collaboration (CKD-EPI) equation refit without adjustment for race.       Narrative:      GFR Normal >60  Chronic Kidney Disease <60  Kidney Failure <15    The GFR formula is only valid for adults with stable renal function between ages 18 and 70.    CK [[id number]]  (Normal) Collected: 12/20/22 0643    Specimen: Blood Updated: 12/20/22 0702     Creatine Kinase 177 U/L     Urinalysis With Culture If Indicated - Urine, Clean Catch [[id number]]   (Abnormal) Collected: 12/19/22 0203    Specimen: Urine, Clean Catch Updated: 12/19/22 0215     Color, UA Yellow     Appearance, UA Clear     pH, UA 5.5     Specific Gravity, UA 1.015     Glucose, UA Negative     Ketones, UA 15 mg/dL (1+)     Bilirubin, UA Negative     Blood, UA Trace     Protein, UA 30 mg/dL (1+)     Leuk Esterase, UA Trace     Nitrite, UA Positive     Urobilinogen, UA 0.2 E.U./dL    Narrative:      In absence of clinical symptoms, the presence of pyuria, bacteria, and/or nitrites on the urinalysis result does not correlate with infection.    Urinalysis, Microscopic Only - Urine, Clean Catch [[id number]]  (Abnormal) Collected: 12/19/22 0203    Specimen: Urine, Clean Catch Updated: 12/19/22 0215     RBC, UA 0-2 /HPF      WBC, UA 13-20 /HPF      Bacteria, UA 4+ /HPF      Squamous Epithelial Cells, UA 0-2 /HPF      Hyaline Casts, UA None Seen /LPF      Methodology Automated Microscopy    Comprehensive Metabolic Panel [[id number]]  (Abnormal) Collected: 12/19/22 0102    Specimen: Blood Updated: 12/19/22 0143     Glucose 190 mg/dL      BUN 23 mg/dL      Creatinine 1.17 mg/dL      Sodium 138 mmol/L      Potassium 3.8 mmol/L      Chloride 105 mmol/L      CO2 22.0 mmol/L      Calcium 9.2 mg/dL      Total Protein 7.0 g/dL      Albumin 4.10 g/dL      ALT (SGPT) 10 U/L      AST (SGOT) 12 U/L      Alkaline Phosphatase 96 U/L      Total Bilirubin 0.3 mg/dL      Globulin 2.9 gm/dL      A/G Ratio 1.4 g/dL      BUN/Creatinine Ratio 19.7     Anion Gap 11.0 mmol/L      eGFR 50.0 mL/min/1.73      Comment: National Kidney Foundation and American Society of Nephrology (ASN) Task Force recommended calculation based on the Chronic Kidney Disease Epidemiology Collaboration (CKD-EPI) equation refit without adjustment for race.       Narrative:      GFR Normal >60  Chronic Kidney Disease <60  Kidney Failure <15    The GFR formula is only valid for adults with stable renal function between ages 18 and 70.    CK [[id number]]   (Abnormal) Collected: 12/19/22 0102    Specimen: Blood Updated: 12/19/22 0140     Creatine Kinase 230 U/L     CBC & Differential [[id number]]  (Abnormal) Collected: 12/19/22 0102    Specimen: Blood Updated: 12/19/22 0107    Narrative:      The following orders were created for panel order CBC & Differential.  Procedure                               Abnormality         Status                     ---------                               -----------         ------                     CBC Auto Differential[[id number]]        Abnormal            Final result                 Please view results for these tests on the individual orders.    CBC Auto Differential [[id number]]  (Abnormal) Collected: 12/19/22 0102    Specimen: Blood Updated: 12/19/22 0107     WBC 8.60 10*3/mm3      RBC 4.08 10*6/mm3      Hemoglobin 10.5 g/dL      Hematocrit 34.4 %      MCV 84.2 fL      MCH 25.8 pg      MCHC 30.7 g/dL      RDW 14.4 %      RDW-SD 42.9 fl      MPV 9.0 fL      Platelets 269 10*3/mm3      Neutrophil % 78.9 %      Lymphocyte % 14.8 %      Monocyte % 5.9 %      Eosinophil % 0.0 %      Basophil % 0.4 %      Neutrophils, Absolute 6.80 10*3/mm3      Lymphocytes, Absolute 1.30 10*3/mm3      Monocytes, Absolute 0.50 10*3/mm3      Eosinophils, Absolute 0.00 10*3/mm3      Basophils, Absolute 0.00 10*3/mm3      nRBC 0.0 /100 WBC           Imaging Results (Most Recent)     Procedure Component Value Units Date/Time    MRI Lumbar Spine Without Contrast [[id number]] Collected: 12/19/22 1109     Updated: 12/19/22 1125    Narrative:      Examination: MRI LUMBAR SPINE WO CONTRAST-     Date of Exam: 12/19/2022 8:58 AM     Indication: Lumbar radiculopathy, symptoms persist with > 6 wks  treatment; N39.0-Urinary tract infection, site not specified;  M54.59-Other low back pain; M51.36-Other intervertebral disc  degeneration, lumbar region; M54.42-Lumbago with sciatica, left side;  Z86.39-Personal history of other endocrine, nutritional and  metabolic  disease; Z87.448-Personal history of other diseases of urinary system.     Comparison: CT lumbar spine 12/19/2022     Technique: Standard noncontrast MR pulse sequences of the lumbar spine  were obtained.     Findings:  There are 5 nonrib-bearing lumbar-type vertebral bodies. Vertebral body  heights are maintained. There is anterolisthesis of L4 on L5 measuring 6  mm secondary to facet arthritis. The remaining lumbar vertebral bodies  are maintained in alignment. Negative for acute fracture. Multilevel  lumbar disc desiccation. The conus medullaris terminates at the T12  level. Kidneys without hydronephrosis. Abdominal aorta without aneurysm.  Visualized portions of the pelvis are intact. Posterior spinous  processes intact. Sigmoid diverticulosis.     T11-12: Negative for disc bulge. Facet joints normally aligned. No canal  or foraminal stenosis.     T12-L1: Negative for disc bulge. Mild facet arthritis. No canal or  foraminal stenosis.     L1-2: Mild circumferential disc bulge. Mild facet arthritis. No central  canal stenosis. Negative for foraminal stenosis     L2-3: Mild circumferential disc bulge. Mild narrowing at the left  paracentral canal. Mild facet arthritis without significant central  canal stenosis. Mild left foraminal stenosis. No right foraminal  stenosis.     L3-4: Mild circumferential disc bulge. Mild facet arthritis and moderate  ligamentum flavum thickening. Small facet effusion on the left.  Combination of disc disease and ligamentum flavum thickening  contributing to mild to moderate central canal stenosis. Mild bilateral  foraminal stenosis.     L4-5: There is anterolisthesis of L4 on L5 with uncovering of the disc.  Osseous fusion of the left L4-5 facet joint. Bilateral facet arthritis  and ligament flavum thickening contributing to mild central canal  stenosis. Moderate left foraminal stenosis. Mild to moderate right  foraminal stenosis.     L5-S1: Mild circumferential disc bulge.  Severe facet arthritis. Facet  fluid noted on the left. Negative for central canal stenosis. Moderate  left foraminal stenosis. Mild right foraminal stenosis. Mild narrowing  at the left and right lateral recess.       Impression:      1. Grade 1 anterolisthesis of L4 on L5 measuring 6 mm related to facet  arthritis.  2. Multilevel lumbar degenerative findings above most pronounced at L4-5  and L5-S1.  3. Negative for acute osseous abnormality.     Electronically Signed By-Arpan Crespo MD On:12/19/2022 11:23 AM  This report was finalized on [id number] by  Arpan Crespo MD.    CT Lumbar Spine Without Contrast [[id number]] Collected: 12/18/22 2308     Updated: 12/19/22 0120    Narrative:      EXAMINATION: CT LUMBAR SPINE WO CONTRAST    DATE: 12/19/2022 12:45 AM     INDICATION: Sudden increased lower back pain with left sciatica.     COMPARISON: None available.     TECHNIQUE: Noncontrast CT imaging through the lumbar spine was performed in the axial plane. Coronal and sagittal reformats were generated.  CT dose lowering techniques were used, to include: automated exposure control, adjustment for patient size, and   or use of iterative reconstruction    FINDINGS:    Vertebral column:    Mild levoconvex curvature of the mid lumbar spine. Grade 1 anterolisthesis of L4 on L5. There are five lumbar type vertebral bodies. No acute fracture. Vertebral body heights are maintained. Bone mineral density is decreased. Sclerotic changes about the   right sacroiliac joint. Mild degenerative changes of the left sacroiliac joint.    T12-L1: No significant central canal or foraminal narrowing.    L1-L2: Broad-based disc bulge without significant spinal canal stenosis. Mild facet arthropathy without significant neural foraminal stenosis.    L2-L3: Shallow disc bulge without significant spinal canal stenosis. Foraminal disc components and mild right greater than left facet arthropathy result in mild bilateral neural foraminal  stenosis.    L3-L4: Shallow disc osteophyte complex and ligamentum flavum hypertrophy result in at least moderate spinal canal stenosis with the thecal sac measuring 7 mm in AP dimension. Foraminal disc components and mild bilateral facet arthropathy result in mild   bilateral neural foraminal stenosis.    L4-L5: Anterolisthesis with uncovered disc components combined with ligamentum flavum hypertrophy result in mild spinal canal stenosis with the thecal sac measuring 9 mm in AP dimension. Anterolisthesis and severe bilateral facet arthropathy result in   moderate to severe bilateral neural foraminal stenosis. Solid osseous fusion of the L4-5 facet joints is noted.    L5-S1: Broad-based disc bulge without significant spinal canal stenosis. Severe bilateral facet arthropathy with chronic appearing fragmented osteophytes on the left. Foraminal disc components and hypertrophic facet arthropathy result in severe left and   moderate right neural foraminal stenosis.    Soft tissues:    Aortoiliac atherosclerosis.        Impression:        1.  Multilevel lumbar spondylosis as described, most prominently at L3-4 through L5-S1. Of note, neural foraminal stenosis is moderate to severe bilaterally at L4-5, and severe on the left and L5-S1. Spinal canal stenosis is moderate to severe at L3-4.   MRI could more accurately assess degrees of spinal canal and neural foraminal stenosis.    2.  Chronic appearing fragmented facet osteophytes on the left at L5-S1. MRI as above could further assess acuity if clinically indicated.    3.  Right-sided sacroiliitis. Mild degenerative changes of the left sacroiliac joint.    Electronically signed by:  Trever Salas M.D.    12/18/2022 11:19 PM Mountain Time        reviewed    ECG/EMG Results (most recent)     None        reviewed            Microbiology Results (last 10 days)     Procedure Component Value - Date/Time    Urine Culture - Urine, Urine, Clean Catch [[id number]]  (Abnormal)  Collected: 12/19/22 0203    Lab Status: Preliminary result Specimen: Urine, Clean Catch Updated: 12/20/22 1006     Urine Culture >100,000 CFU/mL Gram Negative Bacilli    Narrative:      Colonization of the urinary tract without infection is common. Treatment is discouraged unless the patient is symptomatic, pregnant, or undergoing an invasive urologic procedure.          Assessment & Plan     Degeneration of lumbar intervertebral disc with acute herniation       Degeneration of lumbar intervertebral disc with acute herniation   -Physical therapy consult  -Case management consult  -CK 230  -X-ray lumbar spine shows no acute osseous abnormality, degenerative grade 1 anterolisthesis of L4 on L5 with mild degenerative changes  -CT lumbar spine shows multilevel lumbar spondylosis most probably L3-4 through L5-S1, neural foraminal stenosis at moderate to severe bilateral L4-5 and severe in the left and L5-S1; chronic fragmented fossa osteophytes in the left L5-S1; mild degenerative change of the left sacroiliac joint  -MRI lumbar spine shows grade 1 anterolisthesis of L4 on L5 measuring 6 mm related to facet arthritis, multilevel lumbar degenerative findings most pronounced at L4-5 and L5-S1 negative for acute abnormalities  -IV steroids  -IV/oral analgesics as needed  -Continue zanaflex and lidocaine   -Back brace application  -PT recommending inpatient rehab  -[facility]     Acute urinary tract infection without hematuria  -Urinalysis shows ketones, trace blood, positive nitrates, leukocytes, protein, 0-2 RBC, 13-20 WBC, 4+ bacteria  -Urine culture pending  -IV fluids  -IV Rocephin empirically     OCTAVIO        Lab Results   Component Value Date     CREATININE 1.47 (H) 12/21/2022     BUN 46 (H) 12/21/2022     BCR 31.3 (H) 12/21/2022   -Hold lisinopril  -Fluid bolus ordered  -Avoid nephrotoxic medication IV dye unless urgently needed  -Monitor BMP and I's and O's while  admitted     Diabetes mellitus type 2  -Hold p.o. diabetic medication  -Monitor blood glucose before meals and at bedtime  -Initiate sliding scale insulin  -Continue Lantus     Chronic essential hypertension  - Continue amlodipine, atorvastatin, carvedilol, hydralazine, and olmesartan  - Continuous cardiac monitoring     Hypothyroidism  -Continue levothyroxine     Obesity (BMI: 31.64)  -Encouraged on lifestyle modifications    I discussed the patients findings and my recommendations with patient and nursing staff.     Discharge Diagnosis:      Degeneration of lumbar intervertebral disc with acute herniation      Hospital Course  Patient is a 72 y.o. female presented with low back pain with radiculopathy and in HPI noted above.  X-ray of lumbar spine showed no acute abnormality with degenerative grade 1 anterolisthesis L4 on L5 and mild degenerative changes.  CT of lumbar spine was obtained which showed multilevel spondylolysis with neuroforaminal stenosis moderate to severe bilateral L4-5 and severe in the left L5-S1 with chronic fragmented Fost osteophytes and left L5-S1 mild degenerative change in the left sacroiliac joint.  MRI of lumbar spine was obtained which showed grade 1 anterolisthesis with L4 on L5 measuring 6 mm related to facet arthritis and multilevel lumbar degenerative changes most pronounced at L4-5 and L5-S1 negative for acute abnormalities.  She was started on IV steroids as well as analgesics while admitted and back brace was ordered.  PT was consulted who recommended inpatient rehab which was arranged by case management.  Patient is also noted to have an acute urinary tract infection with UA showing 4+ bacteria as well as 13-20 WBCs, 0-2 RBCs, protein, leukocyte esterase, nitrites, blood and ketones.  Urine culture was positive for E. coli.  Serum creatinine was noted as elevated with most recent finding of 1.47 with a BUN of 46 and a BUN/creatinine ratio of 31.3.  She had been on IV fluid  infusion.  Her lisinopril was held and 1 L fluid bolus was ordered.  At this time patient felt to be in good condition for discharge to inpatient rehab facility.  Her full testing/results and plan were discussed with patient along with concerning/alarm symptoms for which to call 911/return to the ED. [place], prednisone and Omnicef will be prescribed at discharge and patient will be instructed to hold her ARB while monitoring logging heart rates and blood pressures to discuss with PCP at next visit.  All questions were answered and she verbalizes her understanding and agreement.    Past Medical History:   History reviewed. No pertinent past medical history.    Past Surgical History:   History reviewed. No pertinent surgical history.    Social History:   Social History     Socioeconomic History   • Marital status:    Tobacco Use   • Smoking status: Never   • Smokeless tobacco: Never       Procedures Performed         Consults:   Consults     Date and Time Order Name Status Description    12/21/2022 10:19 AM Inpatient Hospitalist Consult            Condition on Discharge:     Stable    Discharge Disposition  Home or Self Care    Discharge Medications     Discharge Medications      New Medications      Instructions Start Date   cefdinir 300 MG capsule  Commonly known as: OMNICEF   300 mg, Oral, 2 Times Daily      HYDROcodone-acetaminophen 7.5-325 MG per tablet  Commonly known as: Norco   1 tablet, Oral, Every 6 Hours PRN      predniSONE 10 MG tablet  Commonly known as: DELTASONE   Take 3 tablets on day 1 and 2, 2 tablets on day 3 and 4, 1 tablet on day 5 and 6         Continue These Medications      Instructions Start Date   acetaminophen 325 MG tablet  Commonly known as: TYLENOL   650 mg, Oral, Every 6 Hours PRN      amLODIPine 10 MG tablet  Commonly known as: NORVASC   10 mg, Oral, Nightly      atorvastatin 20 MG tablet  Commonly known as: LIPITOR   40 mg, Oral, Nightly      Calcium-Ergocalciferol 250-2.5 MG-MCG  tablet   250 mg, Oral, Weekly, On Monday at 8pm      carvedilol 3.125 MG tablet  Commonly known as: COREG   3.125 mg, Oral, 2 Times Daily With Meals      hydrALAZINE 10 MG tablet  Commonly known as: APRESOLINE   10 mg, Oral, Nightly, States she takes 20 mg in the am and 10mg at hs      hydrALAZINE 10 MG tablet  Commonly known as: APRESOLINE   20 mg, Oral, Every Morning, Pt states she takes 20mg in the morning at 10mg at hs      insulin detemir 100 UNIT/ML injection  Commonly known as: LEVEMIR   100 Units, Subcutaneous, Nightly      Insulin Lispro 100 UNIT/ML injection  Commonly known as: humaLOG   30 Units, Subcutaneous, 3 Times Daily Before Meals      levothyroxine 100 MCG tablet  Commonly known as: SYNTHROID, LEVOTHROID   100 mcg, Oral, Daily      lidocaine 5 %  Commonly known as: LIDODERM   1 patch, Transdermal, Every 24 Hours, Remove & Discard patch within 12 hours or as directed by MD      TiZANidine 2 MG capsule  Commonly known as: ZANAFLEX   2 mg, Oral, 3 Times Daily         Stop These Medications    olmesartan 20 MG tablet  Commonly known as: BENICAR            Discharge Diet:     Activity at Discharge:   Activity Instructions     Driving Restrictions      Type of Restriction: Driving    Driving Restrictions: No Driving While Taking Narcotics          Follow-up Appointments  No future appointments.  Additional Instructions for the Follow-ups that You Need to Schedule     Ambulatory Referral to Physical Therapy Evaluate and treat   As directed      Specialty needed: Evaluate and treat    Follow-up needed: Yes         Discharge Follow-up with PCP   As directed       Currently Documented PCP:    Evita Keith APRN    PCP Phone Number:    [phone]     Follow Up Details: 5 to 7 days               Test Results Pending at Discharge  Pending Labs     Order Current Status    Urine Culture - Urine, Urine, Clean Catch Preliminary result           Risk for Readmission (LACE) Score: 4 (12/21/2022  6:00  AM)          Josué Echols PA-C  12/21/22  12:39 EST

## 2022-12-21 NOTE — PLAN OF CARE
Goal Outcome Evaluation:  Plan of Care Reviewed With: patient        Progress: improving  Outcome Evaluation: Patient discharging to [facility] rehab.

## 2022-12-21 NOTE — PLAN OF CARE
Problem: Adult Inpatient Plan of Care  Goal: Plan of Care Review  Outcome: Ongoing, Progressing  Goal: Patient-Specific Goal (Individualized)  Outcome: Ongoing, Progressing  Goal: Absence of Hospital-Acquired Illness or Injury  Outcome: Ongoing, Progressing  Intervention: Identify and Manage Fall Risk  Recent Flowsheet Documentation  Taken 12/21/2022 0440 by Tanika Miller RN  Safety Promotion/Fall Prevention:   safety round/check completed   room organization consistent   nonskid shoes/slippers when out of bed   lighting adjusted   fall prevention program maintained   clutter free environment maintained   assistive device/personal items within reach   activity supervised  Taken 12/21/2022 0250 by Tanika Miller RN  Safety Promotion/Fall Prevention:   safety round/check completed   room organization consistent   nonskid shoes/slippers when out of bed   lighting adjusted   fall prevention program maintained   clutter free environment maintained   assistive device/personal items within reach   activity supervised  Taken 12/21/2022 0010 by Tanika Miller RN  Safety Promotion/Fall Prevention:   safety round/check completed   room organization consistent   nonskid shoes/slippers when out of bed   lighting adjusted   clutter free environment maintained   assistive device/personal items within reach   activity supervised   fall prevention program maintained  Taken 12/20/2022 2255 by Tanika Miller RN  Safety Promotion/Fall Prevention:   safety round/check completed   room organization consistent   nonskid shoes/slippers when out of bed   fall prevention program maintained   clutter free environment maintained   assistive device/personal items within reach   activity supervised   lighting adjusted  Taken 12/20/2022 2040 by Tanika Miller, RN  Safety Promotion/Fall Prevention:   safety round/check completed   room organization consistent   nonskid shoes/slippers when out of bed   lighting  adjusted   fall prevention program maintained   clutter free environment maintained   assistive device/personal items within reach   activity supervised  Intervention: Prevent Skin Injury  Recent Flowsheet Documentation  Taken 12/21/2022 0440 by Tanika Miller RN  Body Position: position changed independently  Taken 12/21/2022 0010 by Tanika Miller RN  Body Position: position changed independently  Taken 12/20/2022 2040 by Tanika Miller RN  Body Position: position changed independently  Intervention: Prevent and Manage VTE (Venous Thromboembolism) Risk  Recent Flowsheet Documentation  Taken 12/21/2022 0440 by Tanika Miller RN  Activity Management: activity adjusted per tolerance  Taken 12/21/2022 0010 by Tanika Miller RN  Activity Management: activity adjusted per tolerance  Taken 12/20/2022 2040 by Tanika Miller RN  Activity Management: activity adjusted per tolerance  Intervention: Prevent Infection  Recent Flowsheet Documentation  Taken 12/21/2022 0440 by Tanika Miller RN  Infection Prevention:   single patient room provided   rest/sleep promoted   personal protective equipment utilized   hand hygiene promoted  Taken 12/21/2022 0250 by Tanika Miller RN  Infection Prevention:   rest/sleep promoted   single patient room provided   personal protective equipment utilized   hand hygiene promoted  Taken 12/21/2022 0010 by Tanika Miller RN  Infection Prevention:   single patient room provided   rest/sleep promoted   personal protective equipment utilized   hand hygiene promoted  Taken 12/20/2022 2255 by Tanika Miller RN  Infection Prevention:   single patient room provided   rest/sleep promoted   personal protective equipment utilized   hand hygiene promoted  Taken 12/20/2022 2040 by Tanika Miller RN  Infection Prevention:   single patient room provided   rest/sleep promoted   personal protective equipment utilized   hand hygiene promoted  Goal:  Optimal Comfort and Wellbeing  Outcome: Ongoing, Progressing  Intervention: Monitor Pain and Promote Comfort  Recent Flowsheet Documentation  Taken 12/20/2022 2040 by Tanika Miller RN  Pain Management Interventions: see MAR  Intervention: Provide Person-Centered Care  Recent Flowsheet Documentation  Taken 12/21/2022 0010 by Tanika Miller RN  Trust Relationship/Rapport:   care explained   choices provided   questions answered   questions encouraged   reassurance provided   thoughts/feelings acknowledged  Taken 12/20/2022 2040 by Tanika Miller RN  Trust Relationship/Rapport:   care explained   choices provided   questions answered   questions encouraged   reassurance provided   thoughts/feelings acknowledged  Goal: Readiness for Transition of Care  Outcome: Ongoing, Progressing     Problem: Fall Injury Risk  Goal: Absence of Fall and Fall-Related Injury  Outcome: Ongoing, Progressing  Intervention: Identify and Manage Contributors  Recent Flowsheet Documentation  Taken 12/21/2022 0440 by Tanika Miller, RN  Medication Review/Management: medications reviewed  Self-Care Promotion:   BADL personal objects within reach   BADL personal routines maintained  Taken 12/21/2022 0250 by Tanika Miller RN  Medication Review/Management: medications reviewed  Taken 12/21/2022 0010 by Tanika Miller RN  Medication Review/Management: medications reviewed  Self-Care Promotion:   BADL personal routines maintained   BADL personal objects within reach  Taken 12/20/2022 2255 by Tanika Miller RN  Medication Review/Management: medications reviewed  Taken 12/20/2022 2040 by Tanika Miller RN  Medication Review/Management: medications reviewed  Self-Care Promotion:   BADL personal objects within reach   BADL personal routines maintained  Intervention: Promote Injury-Free Environment  Recent Flowsheet Documentation  Taken 12/21/2022 0440 by Tanika Miller RN  Safety Promotion/Fall  Prevention:   safety round/check completed   room organization consistent   nonskid shoes/slippers when out of bed   lighting adjusted   fall prevention program maintained   clutter free environment maintained   assistive device/personal items within reach   activity supervised  Taken 12/21/2022 0250 by Tanika Miller RN  Safety Promotion/Fall Prevention:   safety round/check completed   room organization consistent   nonskid shoes/slippers when out of bed   lighting adjusted   fall prevention program maintained   clutter free environment maintained   assistive device/personal items within reach   activity supervised  Taken 12/21/2022 0010 by Tanika Miller RN  Safety Promotion/Fall Prevention:   safety round/check completed   room organization consistent   nonskid shoes/slippers when out of bed   lighting adjusted   clutter free environment maintained   assistive device/personal items within reach   activity supervised   fall prevention program maintained  Taken 12/20/2022 2255 by Tanika Miller, RN  Safety Promotion/Fall Prevention:   safety round/check completed   room organization consistent   nonskid shoes/slippers when out of bed   fall prevention program maintained   clutter free environment maintained   assistive device/personal items within reach   activity supervised   lighting adjusted  Taken 12/20/2022 2040 by Tanika Miller, RN  Safety Promotion/Fall Prevention:   safety round/check completed   room organization consistent   nonskid shoes/slippers when out of bed   lighting adjusted   fall prevention program maintained   clutter free environment maintained   assistive device/personal items within reach   activity supervised     Problem: Skin Injury Risk Increased  Goal: Skin Health and Integrity  Outcome: Ongoing, Progressing  Intervention: Optimize Skin Protection  Recent Flowsheet Documentation  Taken 12/21/2022 0440 by Tanika Miller, RN  Head of Bed (HOB) Positioning: HOB  at 30-45 degrees  Taken 12/21/2022 0010 by Tanika Miller, RN  Head of Bed (HOB) Positioning: HOB at 30-45 degrees  Taken 12/20/2022 2040 by Tanika Miller, RN  Pressure Reduction Techniques: frequent weight shift encouraged  Head of Bed (HOB) Positioning: HOB at 30-45 degrees   Goal Outcome Evaluation:

## 2022-12-22 NOTE — CASE MANAGEMENT/SOCIAL WORK
Case Management Discharge Note      Final Note: [facility]         Selected Continued Care - Discharged on 12/21/2022 Admission date: 12/18/2022 - Discharge disposition: Home or Self Care    Destination Coordination complete.    Service Provider Selected Services Address Phone Fax Patient Preferred    [facility] [address] [place] [address] [place] [address] [phone] --                      Transportation Services  Private: Car    Final Discharge Disposition Code: 62 - inpatient rehab facility

## 2022-12-24 ENCOUNTER — HOSPITAL ENCOUNTER (EMERGENCY)
Facility: HOSPITAL | Age: 72
Discharge: HOME | End: 2022-12-24
Admitting: EMERGENCY MEDICINE

## 2022-12-24 VITALS
HEART RATE: 76 BPM | DIASTOLIC BLOOD PRESSURE: 74 MMHG | SYSTOLIC BLOOD PRESSURE: 146 MMHG | WEIGHT: 214 LBS | RESPIRATION RATE: 16 BRPM | TEMPERATURE: 98.8 F | BODY MASS INDEX: 31.7 KG/M2 | OXYGEN SATURATION: 99 % | HEIGHT: 69 IN

## 2022-12-24 DIAGNOSIS — M54.10 RADICULOPATHY, UNSPECIFIED SPINAL REGION: Primary | ICD-10-CM

## 2022-12-24 PROCEDURE — 99284 EMERGENCY DEPT VISIT MOD MDM: CPT

## 2022-12-24 RX ORDER — OXYCODONE HYDROCHLORIDE 5 MG/1
5 TABLET ORAL ONCE
Status: COMPLETED | OUTPATIENT
Start: 2022-12-24 | End: 2022-12-24

## 2022-12-24 RX ORDER — OXYCODONE HYDROCHLORIDE AND ACETAMINOPHEN 5; 325 MG/1; MG/1
1 TABLET ORAL EVERY 6 HOURS PRN
Qty: 10 TABLET | Refills: 0 | Status: SHIPPED | OUTPATIENT
Start: 2022-12-24

## 2022-12-24 RX ADMIN — OXYCODONE 5 MG: 5 TABLET ORAL at 15:54

## 2022-12-24 NOTE — ED NOTES
Pt at [facility] for sciatic nerve pain. Pt states unable to do PT do to pain. Pain radiates from left buttocks to the back of the knee.

## 2022-12-24 NOTE — DISCHARGE PLACEMENT REQUEST
"Ml Reyes (72 y.o. Female)     Date of Birth   1950    Social Security Number       Address   [address] [place] [address] [address] [place] [facility]    Home Phone   [phone]    MRN   [id number]       Mu-ism   Quaker    Marital Status                               Admission Date   12/24/22    Admission Type   Emergency    Admitting Provider       Attending Provider       Department, Room/Bed   [facility] EMERGENCY DEPARTMENT, 29/29       Discharge Date       Discharge Disposition       Discharge Destination                               Attending Provider: (none)   Allergies: No Known Allergies    Isolation: None   Infection: None   Code Status: Prior    Ht: 175.3 cm (69\")   Wt: 97.1 kg (214 lb)    Admission Cmt: None   Principal Problem: None                Active Insurance as of 12/24/2022     Primary Coverage     Payor Plan Insurance Group Employer/Plan Group    MEDICARE MEDICARE A & B      Payor Plan Address Payor Plan Phone Number Payor Plan Fax Number Effective Dates    [address] [phone]  2/1/2016 - None Entered    [facility] [id number]       Subscriber Name Subscriber Birth Date Member ID       ML REYES 1950 [id number]           Secondary Coverage     Payor Plan Insurance Group Employer/Plan Group           Payor Plan Address Payor Plan Phone Number Payor Plan Fax Number Effective Dates    [address] [address] [phone]  7/24/2015 - None Entered    [facility] [id number]       Subscriber Name Subscriber Birth Date Member ID       ML REYES 1950 [id number]                 Emergency Contacts      (Rel.) Home Phone Work Phone Mobile Phone    BURAK REYES (Spouse) [phone] -- [phone]               Emergency Department Notes      Dolly Emery, RN at 12/24/22 1202        Pt at [facility] for sciatic nerve pain. Pt states unable to do PT do to pain. Pain radiates from left buttocks to the back of the knee. "     Electronically signed by Dolly Emery RN at 12/24/22 1203     Lisa Hernandez at 12/24/22 1348        3rd call placed to neurosurgery    Electronically signed by Lisa Hernandez at 12/24/22 1345

## 2022-12-24 NOTE — ED PROVIDER NOTES
Subjective   History of Present Illness  Chief complaint: Back pain      Context: Patient is a 72-year-old female past medical history significant for obesity OCTAVIO hypertension diabetes who presents from [facility] with complaints of back pain.  She was recently seen and evaluated at this facility and discharged on December 21 with the same complaints and was sent to inpatient rehab with a prescription for Zanaflex lidocaine Norco and a back brace.  She states her pain is unchanged but is also unrelenting despite medications and also states she was recently started on gabapentin.  She denies any saddle anesthesia bowel or bladder incontinence or retention or change in her pain or weakness.  She states she has no pain while at rest and laying down but has significant pain with standing and difficulty bearing weight on her leg.  She denies any chest pain shortness of breath fever abdominal pain nausea vomiting diarrhea or urinary complaints.  No reported fever or IVDA or midline back pain    Location: Left leg  Duration:  Timing: Waxes and wanes  Quality/Severity: As above  Modifying factors: Worse with weightbearing    Associated symptoms: Denies        Additional hx provided by: Self    PCP: Sagar             Review of Systems   Constitutional: Negative for fever.   HENT: Negative.    Eyes: Negative for visual disturbance.   Respiratory: Negative.    Cardiovascular: Negative.    Gastrointestinal: Negative.    Genitourinary: Negative.    Musculoskeletal: Positive for myalgias.   Skin: Negative for rash.   Allergic/Immunologic: Negative for immunocompromised state.   Neurological: Positive for weakness and numbness.   Hematological: Does not bruise/bleed easily.          No Known Allergies    No past surgical history on file.    No family history on file.    Social History     Socioeconomic History   • Marital status:    Tobacco Use   • Smoking status: Never   • Smokeless tobacco: Never           Objective   Physical  Exam     Vital signs and traige nurse note reviewed.  Constitutional:  Awake, alert; well developed and well nourished.  No acute distress, the patient is examined in hospital gown.  Nontoxic in appearance.  BMI over 30  HEENT:  Normocephalic, atraumatic; pupils are PERRL with intact EOM; oropharynx is pink and moist without edema or erythema.  Neck: Supple, full range of motion without pain;    Cardiovascular: Regular rate and rhythm, normal S1-S2.  Pulmonary: Respiratory effort regular, nonlabored; breath sounds CTA without wheezing or rhonchi.  Abdomen: Soft, nontender, nondistended with normoactive bowel sounds; no rebound or guarding.    Musculoskeletal: Independent range of motion of all extremities, no palpable tenderness or edema.   Back:  Spine is midline and without midline tenderness on palpation of cervical, thoracic, and lumbar spine; paraspinal musculature is tender left SI and without soft tissue swelling, overlying ecchymosis or erythema. ROM is without pain,  Distal muscle strength is 5/5.  Neuro: Alert oriented x3, speech is clear and appropriate.   negative Babinski BLE,   strong and equal dorsiflexion of bilateral great toes L4, L5, S1 motor sensory intact.          Procedures           ED Course  ED Course as of 12/24/22 1455   Sat Dec 24, 2022   1213 Orthospine called [JW]   1213 Spine repaged [JW]   1246 Spine repaged [JW]   1330 Spine repaged [JW]   1431 Spoke with dr clark   [JW]      ED Course User Index  [JW] Sheela Barahona, SPENCER           Labs Reviewed - No data to display  Medications - No data to display  No radiology results for the last day  Prior to Admission medications    Medication Sig Start Date End Date Taking? Authorizing Provider   acetaminophen (TYLENOL) 325 MG tablet Take 650 mg by mouth Every 6 (Six) Hours As Needed for Mild Pain.    ProviderAlli MD   amLODIPine (NORVASC) 10 MG tablet Take 10 mg by mouth Every Night.    ProviderAlli MD   atorvastatin  (LIPITOR) 20 MG tablet Take 40 mg by mouth Every Night.    Alli Hong MD   Calcium-Ergocalciferol 250-2.5 MG-MCG tablet Take 250 mg by mouth 1 (One) Time Per Week. On Monday at 8pm    Alli Hong MD   carvedilol (COREG) 3.125 MG tablet Take 3.125 mg by mouth 2 (Two) Times a Day With Meals.    Alli Hong MD   cefdinir (OMNICEF) 300 MG capsule Take 1 capsule by mouth 2 (Two) Times a Day for 5 days. 12/21/22 12/26/22  Josué Echols PA-C   hydrALAZINE (APRESOLINE) 10 MG tablet Take 10 mg by mouth Every Night. States she takes 20 mg in the am and 10mg at hs    Alli Hong MD   hydrALAZINE (APRESOLINE) 10 MG tablet Take 20 mg by mouth Every Morning. Pt states she takes 20mg in the morning at 10mg at hs    Alli Hong MD   insulin detemir (LEVEMIR) 100 UNIT/ML injection Inject 100 Units under the skin into the appropriate area as directed Every Night.    Alli Hong MD   Insulin Lispro (humaLOG) 100 UNIT/ML injection Inject 30 Units under the skin into the appropriate area as directed 3 (Three) Times a Day Before Meals.    Alli Hong MD   levothyroxine (SYNTHROID, LEVOTHROID) 100 MCG tablet Take 100 mcg by mouth Daily.    Alli Hong MD   lidocaine (LIDODERM) 5 % Place 1 patch on the skin as directed by provider Daily. Remove & Discard patch within 12 hours or as directed by MD 12/18/22   Shirley Graham APRN   oxyCODONE-acetaminophen (PERCOCET) 5-325 MG per tablet Take 1 tablet by mouth Every 6 (Six) Hours As Needed for Severe Pain. 12/24/22   Sheela Barahona APRN   predniSONE (DELTASONE) 10 MG tablet Take 3 tablets on day 1 and 2, 2 tablets on day 3 and 4, 1 tablet on day 5 and 6 12/21/22   Josué Echols PA-C   TiZANidine (ZANAFLEX) 2 MG capsule Take 1 capsule by mouth 3 (Three) Times a Day. 12/18/22   Shirley Graham APRN   HYDROcodone-acetaminophen (Norco) 7.5-325 MG per tablet Take 1 tablet by mouth  "Every 6 (Six) Hours As Needed for Moderate Pain. 12/21/22 12/24/22  Josué Echols PA-C                                     MDM  Number of Diagnoses or Management Options  Radiculopathy, unspecified spinal region  Diagnosis management comments: Chart review:12/18/22: ER  neg xr  rx tizanidine  12/18-12/21/22:   rx norco, prednisone, cefdinir  MRI spine:  IMPRESSION:  1. Grade 1 anterolisthesis of L4 on L5 measuring 6 mm related to facet  arthritis.  2. Multilevel lumbar degenerative findings above most pronounced at L4-5  and L5-S1.  3. Negative for acute osseous abnormality.      /67   Pulse 66   Temp 98.8 °F (37.1 °C) (Oral)   Resp 16   Ht 175.3 cm (69\")   Wt 97.1 kg (214 lb)   SpO2 97%   BMI 31.60 kg/m²             Comorbidities: Anterior listhesis diabetes hypertension  Differentials: Radiculopathy not all inclusive of differentials considered  Radiology interpretation: MRI reviewed from December 19, no new symptoms or recent trauma  Lab interpretation: Not felt to be emergently warranted    Appropriate PPE worn during exam.  Patient denies need for any pain medications at this time.  Inspect reviewed.  I discussed findings with Dr. Arreola with neurosurgery who reviewed the MRI And agrees admission/emergent surgery is not warranted at this time.  Medications will be changed from Norco to Percocet and she was encouraged to follow-up with neuro spine.    i discussed findings with patient who voices understanding of discharge instructions, signs and symptoms requiring return to ED; discharged improved and in stable condition with follow up for re-evaluation.  This document is intended for medical expert use only. Reading of this document by patients and/or patient's family without participating medical staff guidance may result in misinterpretation and unintended morbidity.  Any interpretation of such data is the responsibility of the patient and/or family member responsible for the patient in " concert with their primary or specialist providers, not to be left for sources of online searches such as [employer], [employer] or similar queries. Relying on these approaches to knowledge may result in misinterpretation, misguided goals of care and even death should patients or family members try recommendations outside of the realm of professional medical care in a supervised inpatient environment.     Discussed with Dr. Torres      Final diagnoses:   Radiculopathy, unspecified spinal region       ED Disposition  ED Disposition     ED Disposition   Discharge    Condition   Stable    Comment   --             Sourav Hall IV, MD  [address] [place] [id number] [phone]    Schedule an appointment as soon as possible for a visit   spine         Medication List      New Prescriptions    oxyCODONE-acetaminophen 5-325 MG per tablet  Commonly known as: PERCOCET  Take 1 tablet by mouth Every 6 (Six) Hours As Needed for Severe Pain.        Stop    HYDROcodone-acetaminophen 7.5-325 MG per tablet  Commonly known as: Norco           Where to Get Your Medications      You can get these medications from any pharmacy    Bring a paper prescription for each of these medications  · oxyCODONE-acetaminophen 5-325 MG per tablet          Sheela Barahona, APRN  12/24/22 [id number]

## 2022-12-24 NOTE — DISCHARGE INSTRUCTIONS
Stop taking Norco and start taking Percocet.  Call for follow-up with spine specialist.  PT as tolerated.  Return for any new or worsening symptoms

## 2023-03-03 ENCOUNTER — APPOINTMENT (OUTPATIENT)
Dept: ULTRASOUND IMAGING | Facility: HOSPITAL | Age: 73
End: 2023-03-03
Payer: MEDICARE

## 2023-03-03 ENCOUNTER — HOSPITAL ENCOUNTER (EMERGENCY)
Facility: HOSPITAL | Age: 73
Discharge: HOME | End: 2023-03-03
Attending: EMERGENCY MEDICINE | Admitting: EMERGENCY MEDICINE
Payer: MEDICARE

## 2023-03-03 VITALS
OXYGEN SATURATION: 99 % | HEIGHT: 69 IN | BODY MASS INDEX: 28.14 KG/M2 | SYSTOLIC BLOOD PRESSURE: 153 MMHG | RESPIRATION RATE: 18 BRPM | DIASTOLIC BLOOD PRESSURE: 68 MMHG | TEMPERATURE: 97.1 F | WEIGHT: 190 LBS | HEART RATE: 68 BPM

## 2023-03-03 DIAGNOSIS — M25.472 LEFT ANKLE SWELLING: ICD-10-CM

## 2023-03-03 DIAGNOSIS — M79.89 LEG SWELLING: Primary | ICD-10-CM

## 2023-03-03 PROCEDURE — 99283 EMERGENCY DEPT VISIT LOW MDM: CPT

## 2023-03-03 PROCEDURE — 99283 EMERGENCY DEPT VISIT LOW MDM: CPT | Performed by: EMERGENCY MEDICINE

## 2023-03-03 PROCEDURE — 93971 EXTREMITY STUDY: CPT

## 2023-03-03 RX ORDER — GABAPENTIN 300 MG/1
CAPSULE ORAL
COMMUNITY

## 2023-03-03 RX ORDER — NAPROXEN 500 MG/1
500 TABLET ORAL 2 TIMES DAILY WITH MEALS
Qty: 10 TABLET | Refills: 0 | Status: SHIPPED | OUTPATIENT
Start: 2023-03-03 | End: 2023-03-08

## 2023-03-03 NOTE — DISCHARGE INSTRUCTIONS
US Venous Doppler Lower Extremity Left (duplex)   Final Result   Impression:   No sonographic evidence of left lower extremity deep venous thrombosis.      Electronically Signed: Ophelia De Santiago     3/3/2023 1:08 PM EST     Workstation ID: [id number]

## 2023-03-03 NOTE — ED TRIAGE NOTES
Pt reports L foot swelling that began a few days ago, NKI. Denies pain. + Distal pulses. 2+ pitting edema around ankle. Pt reports when she is in bed the swelling goes down.

## 2023-03-03 NOTE — FSED PROVIDER NOTE
Subjective   History of Present Illness  Patient apparently feels as if there is left ankle swelling with some occasional pain.  Has noticed this recently within the past 1 to 2 days.  There is no injury whatsoever.  No prior history of DVT.  Has no chest symptoms.  Has no pain or tenderness to thigh or calf.  No fevers or chills.  No rash or redness.  No open injury        Review of Systems   All other systems reviewed and are negative.      No past medical history on file.    No Known Allergies    No past surgical history on file.    No family history on file.    Social History     Socioeconomic History   • Marital status:    Tobacco Use   • Smoking status: Never   • Smokeless tobacco: Never           Objective   Physical Exam  Vitals and nursing note reviewed.   Constitutional:       Appearance: Normal appearance.   HENT:      Head: Normocephalic.      Right Ear: External ear normal.      Left Ear: External ear normal.      Nose: Nose normal.   Eyes:      Conjunctiva/sclera: Conjunctivae normal.   Cardiovascular:      Rate and Rhythm: Normal rate.   Pulmonary:      Effort: Pulmonary effort is normal.   Abdominal:      General: There is no distension.   Musculoskeletal:         General: Swelling and tenderness (Left ankle.) present.      Cervical back: Normal range of motion.      Left lower leg: Edema present.   Skin:     Findings: No rash.   Neurological:      Mental Status: She is alert and oriented to person, place, and time.   Psychiatric:         Mood and Affect: Mood normal.         Procedures           ED Course                                           Medical Decision Making  Unilateral left lower extremity swelling.  Only tenderness to the left ankle.  Offered x-ray but she declined.  We will perform ultrasound R/O DVT    DVT scan neg.  Likely MSK or gout, etc.  No hx of gout.  See Ortho/PCP.    Left ankle swelling: acute illness or injury  Leg swelling: acute illness or injury  Amount and/or  Complexity of Data Reviewed  ECG/medicine tests: ordered.      Risk  Prescription drug management.          Final diagnoses:   Leg swelling   Left ankle swelling       ED Disposition  ED Disposition     ED Disposition   Discharge    Condition   Stable    Comment   --             Evita Keith, APRN  [address] [place] [id number] [phone]          Maico Zhang MD  [address] [facility] [place] [address]  [phone]               Medication List      ASK your doctor about these medications    naproxen 500 MG EC tablet  Commonly known as: EC NAPROSYN  Take 1 tablet by mouth 2 (Two) Times a Day With Meals for 5 days.  Ask about: Should I take this medication?           Where to Get Your Medications      These medications were sent to [facility] [id number] - [place], [place] - [address] [place] [address] - [phone]  - [phone] [address] [place] [address]    Phone: [phone]   · naproxen 500 MG EC tablet

## 2023-03-13 ENCOUNTER — APPOINTMENT (OUTPATIENT)
Dept: GENERAL RADIOLOGY | Facility: HOSPITAL | Age: 73
End: 2023-03-13
Payer: MEDICARE

## 2023-03-13 ENCOUNTER — HOSPITAL ENCOUNTER (OUTPATIENT)
Facility: HOSPITAL | Age: 73
Discharge: HOME | End: 2023-03-13
Attending: EMERGENCY MEDICINE | Admitting: EMERGENCY MEDICINE
Payer: MEDICARE

## 2023-03-13 VITALS
HEIGHT: 69 IN | WEIGHT: 185 LBS | TEMPERATURE: 100 F | OXYGEN SATURATION: 97 % | RESPIRATION RATE: 16 BRPM | SYSTOLIC BLOOD PRESSURE: 174 MMHG | DIASTOLIC BLOOD PRESSURE: 80 MMHG | HEART RATE: 86 BPM | BODY MASS INDEX: 27.4 KG/M2

## 2023-03-13 DIAGNOSIS — R50.9 FEVER AND CHILLS: ICD-10-CM

## 2023-03-13 DIAGNOSIS — B34.9 VIRAL ILLNESS: Primary | ICD-10-CM

## 2023-03-13 LAB
FLUAV SUBTYP SPEC NAA+PROBE: NOT DETECTED
FLUBV RNA ISLT QL NAA+PROBE: NOT DETECTED
SARS-COV-2 RNA RESP QL NAA+PROBE: NOT DETECTED

## 2023-03-13 PROCEDURE — 99213 OFFICE O/P EST LOW 20 MIN: CPT | Performed by: EMERGENCY MEDICINE

## 2023-03-13 PROCEDURE — G0463 HOSPITAL OUTPT CLINIC VISIT: HCPCS | Performed by: EMERGENCY MEDICINE

## 2023-03-13 PROCEDURE — 87636 SARSCOV2 & INF A&B AMP PRB: CPT

## 2023-03-13 RX ORDER — ACETAMINOPHEN 500 MG
1000 TABLET ORAL ONCE
Status: COMPLETED | OUTPATIENT
Start: 2023-03-13 | End: 2023-03-13

## 2023-03-13 RX ADMIN — ACETAMINOPHEN 1000 MG: 500 TABLET, FILM COATED ORAL at 18:32

## 2023-03-13 NOTE — FSED PROVIDER NOTE
Subjective   History of Present Illness  Patient with fevers and chills over the past 1 to 2 days.  Mild cough.  No respiratory distress.  Denies any prior lung past medical history.  Took no antipyretics today however did yesterday.  No abdominal or back complaints.        Review of Systems   All other systems reviewed and are negative.      History reviewed. No pertinent past medical history.    No Known Allergies    History reviewed. No pertinent surgical history.    History reviewed. No pertinent family history.    Social History     Socioeconomic History   • Marital status:    Tobacco Use   • Smoking status: Never   • Smokeless tobacco: Never           Objective   Physical Exam  Vitals and nursing note reviewed.   Constitutional:       Appearance: Normal appearance.   HENT:      Head: Normocephalic.      Right Ear: External ear normal.      Left Ear: External ear normal.      Nose: Nose normal.   Eyes:      Conjunctiva/sclera: Conjunctivae normal.   Cardiovascular:      Rate and Rhythm: Normal rate.      Pulses: Normal pulses.   Pulmonary:      Effort: Pulmonary effort is normal.      Breath sounds: Normal breath sounds. No stridor. No wheezing or rhonchi.   Abdominal:      General: There is no distension.   Musculoskeletal:      Cervical back: Normal range of motion.   Skin:     Findings: No rash.   Neurological:      Mental Status: She is alert and oriented to person, place, and time.   Psychiatric:         Mood and Affect: Mood normal.         Procedures           ED Course                                           Medical Decision Making  Patient is not hypoxic or tachycardia and her lung sounds are very normal.  She does have a fever but in light of the current situation of flu and COVID-negative viral illness and her symptoms just starting, I do not think a chest x-ray would really offer much right now.  Recommended supportive care at this time and recheck if needed.  Tylenol given in the  ER    Fever and chills: complicated acute illness or injury  Viral illness: complicated acute illness or injury  Amount and/or Complexity of Data Reviewed  Radiology: ordered.      Risk  OTC drugs.          Final diagnoses:   Viral illness   Fever and chills       ED Disposition  ED Disposition     ED Disposition   Discharge    Condition   Stable    Comment   --             Evita Keith, APRN  [address] [place] [address]  [phone]               Medication List      No changes were made to your prescriptions during this visit.

## 2024-11-01 ENCOUNTER — APPOINTMENT (OUTPATIENT)
Dept: GENERAL RADIOLOGY | Facility: HOSPITAL | Age: 74
End: 2024-11-01
Payer: MEDICARE

## 2024-11-01 ENCOUNTER — HOSPITAL ENCOUNTER (OUTPATIENT)
Facility: HOSPITAL | Age: 74
Discharge: HOME | End: 2024-11-01
Attending: EMERGENCY MEDICINE | Admitting: EMERGENCY MEDICINE
Payer: MEDICARE

## 2024-11-01 VITALS
BODY MASS INDEX: 26.44 KG/M2 | RESPIRATION RATE: 18 BRPM | TEMPERATURE: 98.5 F | DIASTOLIC BLOOD PRESSURE: 65 MMHG | OXYGEN SATURATION: 97 % | HEART RATE: 66 BPM | SYSTOLIC BLOOD PRESSURE: 133 MMHG | HEIGHT: 69 IN | WEIGHT: 178.5 LBS

## 2024-11-01 DIAGNOSIS — J41.1 MUCOPURULENT CHRONIC BRONCHITIS: Primary | ICD-10-CM

## 2024-11-01 PROCEDURE — G0463 HOSPITAL OUTPT CLINIC VISIT: HCPCS | Performed by: EMERGENCY MEDICINE

## 2024-11-01 PROCEDURE — 71046 X-RAY EXAM CHEST 2 VIEWS: CPT

## 2024-11-01 PROCEDURE — 87636 SARSCOV2 & INF A&B AMP PRB: CPT | Performed by: EMERGENCY MEDICINE

## 2024-11-01 RX ORDER — METHYLPREDNISOLONE 4 MG/1
TABLET ORAL
Qty: 21 TABLET | Refills: 0 | Status: SHIPPED | OUTPATIENT
Start: 2024-11-01

## 2024-11-01 RX ORDER — AZITHROMYCIN 250 MG/1
TABLET, FILM COATED ORAL
Qty: 6 TABLET | Refills: 0 | Status: SHIPPED | OUTPATIENT
Start: 2024-11-01

## 2024-11-01 NOTE — FSED PROVIDER NOTE
Subjective   History of Present Illness  Patient is a 74-year-old female presents emergency room with complaints of upper respiratory infection symptoms.  Patient states that she has had shortness of breath and a productive cough with green sputum.  Patient reports persistent symptoms.  She has not seen anybody for her condition.  She is here for evaluation.        Review of Systems   Constitutional:  Positive for fever. Negative for chills and fatigue.   HENT:  Positive for congestion and sore throat.    Eyes: Negative.    Respiratory:  Positive for cough, shortness of breath and wheezing. Negative for chest tightness.    Cardiovascular:  Negative for chest pain and palpitations.   Gastrointestinal:  Negative for abdominal pain, diarrhea, nausea and vomiting.   Genitourinary: Negative.    Musculoskeletal: Negative.    Skin: Negative.  Negative for rash.   Neurological: Negative.  Negative for syncope, weakness, numbness and headaches.   Psychiatric/Behavioral: Negative.     All other systems reviewed and are negative.      No past medical history on file.    No Known Allergies    No past surgical history on file.    No family history on file.    Social History     Socioeconomic History    Marital status:    Tobacco Use    Smoking status: Never    Smokeless tobacco: Never           Objective   Physical Exam  Vitals and nursing note reviewed.   Constitutional:       General: She is not in acute distress.     Appearance: She is normal weight. She is not ill-appearing.      Comments: The following exam was performed from a distance of 6 feet.  The patient had presumed or suspected upper respiratory infection that may be COVID-19.  The patient was in a negative pressure room if possible.  The provider as well as the patient and/or family members were asked to wear a mask when possible.   HENT:      Head: Normocephalic and atraumatic.      Right Ear: External ear normal.      Left Ear: External ear normal.       Nose: Nose normal.   Eyes:      Extraocular Movements: Extraocular movements intact.      Conjunctiva/sclera: Conjunctivae normal.      Pupils: Pupils are equal, round, and reactive to light.   Cardiovascular:      Rate and Rhythm: Normal rate and regular rhythm.      Comments: Per the bedside cardiac monitor  Pulmonary:      Effort: Pulmonary effort is normal. No respiratory distress.      Comments: Patient's oxygen saturation was greater than 90% per the bedside pulse oximetry.  There were NO audible abnormal breath sounds present.  Abdominal:      General: Abdomen is flat. There is no distension.   Musculoskeletal:         General: No swelling, deformity or signs of injury. Normal range of motion.      Cervical back: Normal range of motion and neck supple.   Skin:     General: Skin is warm.      Capillary Refill: Capillary refill takes less than 2 seconds.      Findings: No erythema.   Neurological:      General: No focal deficit present.      Mental Status: She is alert and oriented to person, place, and time. Mental status is at baseline.   Psychiatric:         Mood and Affect: Mood normal.         Procedures           ED Course                                           Medical Decision Making  The patient presents to the emergency room with symptoms concerning for upper respiratory infection of unknown etiology.  Given their presenting symptoms and physical exam, the differential diagnosis includes bacterial/viral pharyngitis, COVID-19, influenza a/B, upper respiratory infection of unspecified viral etiology, sinusitis, tonsillitis, bronchitis, and/or pneumonia.  Appropriate viral swabs, strep swabs, imaging ordered if necessary.    Patient will be treated for upper respiratory infection with steroids and antibiotics.    Problems Addressed:  Mucopurulent chronic bronchitis: complicated acute illness or injury    Amount and/or Complexity of Data Reviewed  Radiology: ordered and independent interpretation  performed. Decision-making details documented in ED Course.    Risk  Prescription drug management.        Final diagnoses:   Mucopurulent chronic bronchitis       ED Disposition  ED Disposition       ED Disposition   Discharge    Condition   Stable    Comment   --               Evita Keith, APRN  [address] [place] [address]  [phone]    Schedule an appointment as soon as possible for a visit in 1 week  For repeat evaluation         Medication List        New Prescriptions      azithromycin 250 MG tablet  Commonly known as: Zithromax Z-Glenn  Take 2 tablets by mouth on day 1, then 1 tablet daily on days 2-5     methylPREDNISolone 4 MG dose pack  Commonly known as: MEDROL  Take as directed on package instructions.               Where to Get Your Medications        These medications were sent to [facility] [id number] - [place], [place] - [facility] - [phone]  - [phone] [address] [place] [address]      Phone: [phone]   azithromycin 250 MG tablet  methylPREDNISolone 4 MG dose pack

## 2024-11-01 NOTE — DISCHARGE INSTRUCTIONS
Take antibiotics and steroids as prescribed for your condition.  Return to ER for any concerns.  Follow-up with PCP in 1 week for repeat evaluation.  Return to ER for any concerns.

## 2025-03-08 PROCEDURE — 99283 EMERGENCY DEPT VISIT LOW MDM: CPT

## 2025-03-09 ENCOUNTER — HOSPITAL ENCOUNTER (EMERGENCY)
Facility: HOSPITAL | Age: 75
Discharge: HOME | End: 2025-03-09
Payer: MEDICARE

## 2025-03-09 VITALS
BODY MASS INDEX: 25.18 KG/M2 | RESPIRATION RATE: 16 BRPM | SYSTOLIC BLOOD PRESSURE: 130 MMHG | OXYGEN SATURATION: 94 % | HEART RATE: 98 BPM | WEIGHT: 170 LBS | DIASTOLIC BLOOD PRESSURE: 64 MMHG | TEMPERATURE: 99.8 F | HEIGHT: 69 IN

## 2025-03-09 DIAGNOSIS — J10.1 INFLUENZA A: Primary | ICD-10-CM

## 2025-03-09 DIAGNOSIS — R50.9 FEVER, UNSPECIFIED FEVER CAUSE: ICD-10-CM

## 2025-03-09 LAB
B PARAPERT DNA SPEC QL NAA+PROBE: NOT DETECTED
B PERT DNA SPEC QL NAA+PROBE: NOT DETECTED
C PNEUM DNA NPH QL NAA+NON-PROBE: NOT DETECTED
FLUAV H1 2009 PAND RNA NPH QL NAA+PROBE: DETECTED
FLUBV RNA ISLT QL NAA+PROBE: NOT DETECTED
HADV DNA SPEC NAA+PROBE: NOT DETECTED
HCOV 229E RNA SPEC QL NAA+PROBE: NOT DETECTED
HCOV HKU1 RNA SPEC QL NAA+PROBE: NOT DETECTED
HCOV NL63 RNA SPEC QL NAA+PROBE: NOT DETECTED
HCOV OC43 RNA SPEC QL NAA+PROBE: NOT DETECTED
HMPV RNA NPH QL NAA+NON-PROBE: NOT DETECTED
HPIV1 RNA ISLT QL NAA+PROBE: NOT DETECTED
HPIV2 RNA SPEC QL NAA+PROBE: NOT DETECTED
HPIV3 RNA NPH QL NAA+PROBE: NOT DETECTED
HPIV4 P GENE NPH QL NAA+PROBE: NOT DETECTED
M PNEUMO IGG SER IA-ACNC: NOT DETECTED
RHINOVIRUS RNA SPEC NAA+PROBE: NOT DETECTED
RSV RNA NPH QL NAA+NON-PROBE: NOT DETECTED
SARS-COV-2 RNA RESP QL NAA+PROBE: NOT DETECTED

## 2025-03-09 PROCEDURE — 0202U NFCT DS 22 TRGT SARS-COV-2: CPT

## 2025-03-09 RX ORDER — ACETAMINOPHEN 500 MG
1000 TABLET ORAL ONCE
Status: COMPLETED | OUTPATIENT
Start: 2025-03-09 | End: 2025-03-09

## 2025-03-09 RX ORDER — OSELTAMIVIR PHOSPHATE 75 MG/1
75 CAPSULE ORAL 2 TIMES DAILY
Qty: 14 CAPSULE | Refills: 0 | Status: SHIPPED | OUTPATIENT
Start: 2025-03-09 | End: 2025-03-16

## 2025-03-09 RX ORDER — BENZONATATE 100 MG/1
100 CAPSULE ORAL 3 TIMES DAILY PRN
Qty: 25 CAPSULE | Refills: 0 | Status: SHIPPED | OUTPATIENT
Start: 2025-03-09

## 2025-03-09 RX ORDER — BENZONATATE 100 MG/1
100 CAPSULE ORAL 3 TIMES DAILY PRN
Status: DISCONTINUED | OUTPATIENT
Start: 2025-03-09 | End: 2025-03-09 | Stop reason: HOSPADM

## 2025-03-09 RX ADMIN — ACETAMINOPHEN 1000 MG: 500 TABLET, FILM COATED ORAL at 00:49

## 2025-03-09 NOTE — ED PROVIDER NOTES
Subjective   History of Present Illness  Patient is a 75-year-old female with past medical history significant for hypertension, CKD and diabetes presenting to the ED for evaluation of fever, cough, and weakness that started yesterday.  She had a 101.9 temperature at home earlier today.  She has been taking Tylenol today for body aches.  Patient reports that she has had some abdominal aching with the cough.  The cough has been dry.  She has had some rhinorrhea and bodyaches.  She denies nausea, vomiting, diarrhea.  Her  has had similar symptoms.  Patient denies any history of lung disease and heart disease.        Review of Systems   Constitutional:  Positive for fever.       No past medical history on file.    No Known Allergies    No past surgical history on file.    No family history on file.    Social History     Socioeconomic History    Marital status:    Tobacco Use    Smoking status: Never    Smokeless tobacco: Never           Objective   Physical Exam  Vitals and nursing note reviewed.   Constitutional:       General: She is not in acute distress.     Appearance: Normal appearance. She is ill-appearing. She is not toxic-appearing or diaphoretic.   HENT:      Head: Normocephalic and atraumatic.      Right Ear: External ear normal.      Left Ear: External ear normal.      Nose: Congestion and rhinorrhea present.      Mouth/Throat:      Mouth: Mucous membranes are moist.   Eyes:      Extraocular Movements: Extraocular movements intact.   Neck:      Vascular: No carotid bruit.   Cardiovascular:      Rate and Rhythm: Normal rate and regular rhythm.      Pulses: Normal pulses.      Heart sounds: Normal heart sounds. No murmur heard.     No friction rub. No gallop.   Pulmonary:      Effort: Pulmonary effort is normal. No respiratory distress.      Breath sounds: Normal breath sounds. No stridor. No wheezing, rhonchi or rales.   Abdominal:      General: Abdomen is flat. Bowel sounds are normal. There  is no distension.      Palpations: Abdomen is soft.      Tenderness: There is no guarding.   Musculoskeletal:      Cervical back: Normal range of motion and neck supple. No rigidity or tenderness.      Right lower leg: No edema.      Left lower leg: No edema.   Lymphadenopathy:      Cervical: No cervical adenopathy.   Skin:     General: Skin is warm and dry.      Capillary Refill: Capillary refill takes 2 to 3 seconds.      Coloration: Skin is not jaundiced.      Findings: No bruising.   Neurological:      General: No focal deficit present.      Mental Status: She is alert.   Psychiatric:         Mood and Affect: Mood normal.         Behavior: Behavior normal.         Procedures           ED Course  ED Course as of 03/09/25 0439   Sun Mar 09, 2025   0055 Waiting on swab [ME]      ED Course User Index  [ME] Holley Brown PA-C        Labs Reviewed   RESPIRATORY PANEL PCR W/ COVID-19 (SARS-COV-2), NP SWAB IN UTM/VTP, 2 HR TAT - Abnormal; Notable for the following components:       Result Value    Influenza A H1 2009 PCR Detected (*)     All other components within normal limits    Narrative:     In the setting of a positive respiratory panel with a viral infection PLUS a negative procalcitonin without other underlying concern for bacterial infection, consider observing off antibiotics or discontinuation of antibiotics and continue supportive care. If the respiratory panel is positive for atypical bacterial infection (Bordetella pertussis, Chlamydophila pneumoniae, or Mycoplasma pneumoniae), consider antibiotic de-escalation to target atypical bacterial infection.     No radiology results for the last day  Medications   acetaminophen (TYLENOL) tablet 1,000 mg (1,000 mg Oral Given 3/9/25 0049)                                                    Medical Decision Making  Patient is a 75-year-old female who presented with the above complaint.  She had the above evaluation and exam.    Imaging was considered but not  deemed emergently warranted.  Patient did not have abnormal lung exam, and she is experiencing no hypoxia on room air.    Respiratory swab positive for influenza A.    Patient was given Tylenol for fever, with improvement.  At reassessment, she is resting comfortably no acute distress.  She is afebrile and hemodynamically stable.  She is not hypoxic.      Discussed various options for treatment.  Patient would like to try Tamiflu and cough suppression.  She was discharged with prescriptions for Tamiflu and Tessalon Perles.  In addition, she will continue to use her humidifier at home.  Return precautions given.  Patient verbalized agreement and understanding to plan for    Risk  OTC drugs.  Prescription drug management.        Final diagnoses:   Influenza A   Fever, unspecified fever cause       ED Disposition  ED Disposition       ED Disposition   Discharge    Condition   Stable    Comment   --               Evita Keith, APRN  [address] [place] [facility]  [phone]    Schedule an appointment as soon as possible for a visit in 3 days  As needed         Medication List        New Prescriptions      benzonatate 100 MG capsule  Commonly known as: Tessalon Perles  Take 1 capsule by mouth 3 (Three) Times a Day As Needed for Cough.     oseltamivir 75 MG capsule  Commonly known as: TAMIFLU  Take 1 capsule by mouth 2 (Two) Times a Day for 7 days.               Where to Get Your Medications        These medications were sent to [facility] [id number] - [place], [place] - [address] [place] [address] - [phone] [facility] [phone] [address] [place] [address]      Phone: [phone]   benzonatate 100 MG capsule  oseltamivir 75 MG capsule            Holley Brown PA-C  03/09/25 0439

## 2025-03-09 NOTE — ED NOTES
Patient reports fever, cough, and body aches starting yesterday. Patient states she took tylenol earlier tonight, but unsure of time. Patient reports it was > 4 hours ago.

## 2025-03-09 NOTE — DISCHARGE INSTRUCTIONS
Follow-up with your PCP in 3 to 4 days if symptoms do not improve.    Take Tylenol for fever.    Take prescription as directed.    Return to the ER with new or worsening symptoms.

## 2025-03-20 ENCOUNTER — APPOINTMENT (OUTPATIENT)
Dept: GENERAL RADIOLOGY | Facility: HOSPITAL | Age: 75
End: 2025-03-20
Payer: MEDICARE

## 2025-03-20 ENCOUNTER — HOSPITAL ENCOUNTER (OUTPATIENT)
Facility: HOSPITAL | Age: 75
Discharge: HOME | End: 2025-03-20
Attending: EMERGENCY MEDICINE | Admitting: EMERGENCY MEDICINE
Payer: MEDICARE

## 2025-03-20 VITALS
RESPIRATION RATE: 16 BRPM | HEIGHT: 69 IN | BODY MASS INDEX: 25.27 KG/M2 | OXYGEN SATURATION: 98 % | HEART RATE: 75 BPM | TEMPERATURE: 97.7 F | SYSTOLIC BLOOD PRESSURE: 128 MMHG | WEIGHT: 170.6 LBS | DIASTOLIC BLOOD PRESSURE: 70 MMHG

## 2025-03-20 DIAGNOSIS — R05.9 COUGH IN ADULT: Primary | ICD-10-CM

## 2025-03-20 PROCEDURE — 71045 X-RAY EXAM CHEST 1 VIEW: CPT

## 2025-03-20 PROCEDURE — 99213 OFFICE O/P EST LOW 20 MIN: CPT

## 2025-03-20 PROCEDURE — G0463 HOSPITAL OUTPT CLINIC VISIT: HCPCS

## 2025-03-20 RX ORDER — BENZONATATE 200 MG/1
200 CAPSULE ORAL 3 TIMES DAILY PRN
Qty: 21 CAPSULE | Refills: 0 | Status: SHIPPED | OUTPATIENT
Start: 2025-03-20 | End: 2025-03-27

## 2025-03-20 RX ORDER — GUAIFENESIN 600 MG/1
600 TABLET, EXTENDED RELEASE ORAL 2 TIMES DAILY
Qty: 14 TABLET | Refills: 0 | Status: SHIPPED | OUTPATIENT
Start: 2025-03-20 | End: 2025-03-27

## 2025-03-20 RX ORDER — ALBUTEROL SULFATE AND BUDESONIDE 90; 80 UG/1; UG/1
1 AEROSOL, METERED RESPIRATORY (INHALATION) EVERY 4 HOURS PRN
Qty: 10.7 G | Refills: 0 | Status: SHIPPED | OUTPATIENT
Start: 2025-03-20

## 2025-03-20 NOTE — DISCHARGE INSTRUCTIONS
Take Tylenol as needed for body aches or fever    Take Tessalon Perles for coughing    Take Mucinex to help with mucus congestion    Take Airsupra inhaler to help with the wheezing and cough as directed.    Increase fluid intake recommend warm steam humidification    Follow-up with family doctor as needed return to ER for worsening symptom

## 2025-03-20 NOTE — FSED PROVIDER NOTE
Subjective   History of Present Illness  75-year-old female reports that she was diagnosed with flu about a month ago reports that she has had a cough.  Reports that she is actually getting better in terms of more energy.  She reports that her  is concerned about how her cough sounds and advised her to come to this facility for chest x-ray.  She is denying chest pain shortness of breath vomiting diarrhea and fever.  She denies history of lung disease including smoking.  Reports her cough is somewhat productive she is not taking any over-the-counter medications.        Review of Systems   All other systems reviewed and are negative.      No past medical history on file.    No Known Allergies    No past surgical history on file.    No family history on file.    Social History     Socioeconomic History    Marital status:    Tobacco Use    Smoking status: Never    Smokeless tobacco: Never           Objective   Physical Exam  Vitals and nursing note reviewed.   Constitutional:       General: She is not in acute distress.     Appearance: Normal appearance. She is not toxic-appearing.   HENT:      Head: Normocephalic and atraumatic.      Right Ear: Ear canal normal.      Left Ear: Ear canal and external ear normal.      Nose: Nose normal.      Mouth/Throat:      Mouth: Mucous membranes are moist.      Pharynx: Oropharynx is clear.   Eyes:      Extraocular Movements: Extraocular movements intact.      Conjunctiva/sclera: Conjunctivae normal.      Pupils: Pupils are equal, round, and reactive to light.   Cardiovascular:      Rate and Rhythm: Normal rate.      Pulses: Normal pulses.      Heart sounds: Normal heart sounds.   Pulmonary:      Effort: Pulmonary effort is normal.      Breath sounds: Wheezing present.   Abdominal:      General: Abdomen is flat.      Palpations: Abdomen is soft.   Musculoskeletal:         General: Normal range of motion.      Cervical back: Normal range of motion and neck supple.    Skin:     General: Skin is warm.      Capillary Refill: Capillary refill takes less than 2 seconds.   Neurological:      General: No focal deficit present.      Mental Status: She is alert and oriented to person, place, and time. Mental status is at baseline.         Procedures           ED Course  ED Course as of 03/20/25 1901   Thu Mar 20, 2025   1748 Chest x-ray  Impression:  1.No acute radiographic abnormality is identified.   [WF]      ED Course User Index  [WF] Stevenson Chang Jr., SPENCER                                           Medical Decision Making  Chest x-ray is negative for acute findings.  Patient has productive cough with right upper lobe wheezing that does improve with deep breathing and coughing.    Will discharge home on Mucinex and Tessalon Perles.  We did discuss the role of steroids however patient is diabetic she also has hypertension.  At this time we will hold off on steroid therapy.  Recommend follow-up with family doctor.    Problems Addressed:  Cough in adult: complicated acute illness or injury    Amount and/or Complexity of Data Reviewed  Radiology: ordered.    Risk  OTC drugs.  Prescription drug management.        Final diagnoses:   Cough in adult       ED Disposition  ED Disposition       ED Disposition   Discharge    Condition   Stable    Comment   --               Evita Keith, SPENCER  [address] [id number]  [phone]               Medication List        New Prescriptions      Airsupra 90-80 MCG/ACT aerosol  Generic drug: Albuterol-Budesonide  Inhale 1 puff Every 4 (Four) Hours As Needed (wheezing, cough).     guaiFENesin 600 MG 12 hr tablet  Commonly known as: MUCINEX  Take 1 tablet by mouth 2 (Two) Times a Day for 7 days.            Changed      benzonatate 200 MG capsule  Commonly known as: TESSALON  Take 1 capsule by mouth 3 (Three) Times a Day As Needed for Cough for up to 7 days.  What changed:   medication strength  how much to take                Where to Get Your Medications        These medications were sent to [facility] [id number] - [place], [place] - [address] [facility] - [phone]  - [phone] [address] [place] [address]      Phone: [phone]   Airsupra 90-80 MCG/ACT aerosol  benzonatate 200 MG capsule  guaiFENesin 600 MG 12 hr tablet